# Patient Record
Sex: FEMALE | Race: WHITE | NOT HISPANIC OR LATINO | Employment: UNEMPLOYED | URBAN - METROPOLITAN AREA
[De-identification: names, ages, dates, MRNs, and addresses within clinical notes are randomized per-mention and may not be internally consistent; named-entity substitution may affect disease eponyms.]

---

## 2017-01-13 ENCOUNTER — HISTORICAL (OUTPATIENT)
Dept: RADIOLOGY | Facility: HOSPITAL | Age: 57
End: 2017-01-13

## 2017-02-13 ENCOUNTER — HISTORICAL (OUTPATIENT)
Dept: INFUSION THERAPY | Facility: HOSPITAL | Age: 57
End: 2017-02-13

## 2017-03-10 ENCOUNTER — HISTORICAL (OUTPATIENT)
Dept: RADIOLOGY | Facility: HOSPITAL | Age: 57
End: 2017-03-10

## 2017-03-14 ENCOUNTER — HISTORICAL (OUTPATIENT)
Dept: RADIOLOGY | Facility: HOSPITAL | Age: 57
End: 2017-03-14

## 2017-04-26 ENCOUNTER — HISTORICAL (OUTPATIENT)
Dept: RADIOLOGY | Facility: HOSPITAL | Age: 57
End: 2017-04-26

## 2017-05-26 ENCOUNTER — HISTORICAL (OUTPATIENT)
Dept: LAB | Facility: HOSPITAL | Age: 57
End: 2017-05-26

## 2017-05-26 LAB
ABS NEUT (OLG): 3.4 X10(3)/MCL (ref 1.5–6.9)
ALBUMIN SERPL-MCNC: 3.8 GM/DL (ref 3.4–5)
ALBUMIN/GLOB SERPL: 1.2 RATIO
ALP SERPL-CCNC: 73 UNIT/L (ref 30–113)
ALT SERPL-CCNC: 20 UNIT/L (ref 10–45)
AST SERPL-CCNC: 16 UNIT/L (ref 15–37)
BASOPHILS # BLD AUTO: 0 X10(3)/MCL (ref 0–0.1)
BASOPHILS NFR BLD AUTO: 0 % (ref 0–1)
BILIRUB SERPL-MCNC: 0.5 MG/DL (ref 0.1–0.9)
BILIRUBIN DIRECT+TOT PNL SERPL-MCNC: 0.1 MG/DL (ref 0–0.3)
BILIRUBIN DIRECT+TOT PNL SERPL-MCNC: 0.4 MG/DL
BUN SERPL-MCNC: 15 MG/DL (ref 10–20)
CALCIUM SERPL-MCNC: 9.1 MG/DL (ref 8–10.5)
CHLORIDE SERPL-SCNC: 105 MMOL/L (ref 100–108)
CO2 SERPL-SCNC: 28 MMOL/L (ref 21–35)
CREAT SERPL-MCNC: 0.84 MG/DL (ref 0.7–1.3)
EOSINOPHIL # BLD AUTO: 0.2 X10(3)/MCL (ref 0–0.6)
EOSINOPHIL NFR BLD AUTO: 3 % (ref 0–5)
ERYTHROCYTE [DISTWIDTH] IN BLOOD BY AUTOMATED COUNT: 13.3 % (ref 11.5–17)
GLOBULIN SER-MCNC: 3.3 GM/DL
GLUCOSE SERPL-MCNC: 104 MG/DL (ref 75–116)
HCT VFR BLD AUTO: 42.1 % (ref 36–48)
HGB BLD-MCNC: 14.5 GM/DL (ref 12–16)
LYMPHOCYTES # BLD AUTO: 1.7 X10(3)/MCL (ref 0.5–4.1)
LYMPHOCYTES NFR BLD AUTO: 29.7 % (ref 15–40)
MCH RBC QN AUTO: 30 PG (ref 27–34)
MCHC RBC AUTO-ENTMCNC: 34 GM/DL (ref 31–36)
MCV RBC AUTO: 86 FL (ref 80–99)
MONOCYTES # BLD AUTO: 0.5 X10(3)/MCL (ref 0–1.1)
MONOCYTES NFR BLD AUTO: 9 % (ref 4–12)
NEUTROPHILS # BLD AUTO: 3.4 X10(3)/MCL (ref 1.5–6.9)
NEUTROPHILS NFR BLD AUTO: 58 % (ref 43–75)
PLATELET # BLD AUTO: 238 X10(3)/MCL (ref 140–400)
PMV BLD AUTO: 10.6 FL (ref 6.8–10)
POTASSIUM SERPL-SCNC: 4 MMOL/L (ref 3.6–5.2)
PROT SERPL-MCNC: 7.1 GM/DL (ref 6.4–8.2)
RBC # BLD AUTO: 4.87 X10(6)/MCL (ref 4.2–5.4)
SODIUM SERPL-SCNC: 141 MMOL/L (ref 135–145)
WBC # SPEC AUTO: 5.8 X10(3)/MCL (ref 4.5–11.5)

## 2017-09-05 ENCOUNTER — HISTORICAL (OUTPATIENT)
Dept: LAB | Facility: HOSPITAL | Age: 57
End: 2017-09-05

## 2017-09-05 LAB
ABS NEUT (OLG): 3.2 X10(3)/MCL (ref 1.5–6.9)
ALBUMIN SERPL-MCNC: 3.9 GM/DL (ref 3.4–5)
ALBUMIN/GLOB SERPL: 1.2 RATIO
ALP SERPL-CCNC: 90 UNIT/L (ref 30–113)
ALT SERPL-CCNC: 25 UNIT/L (ref 10–45)
APPEARANCE, UA: CLEAR
AST SERPL-CCNC: 20 UNIT/L (ref 15–37)
BASOPHILS # BLD AUTO: 0 X10(3)/MCL (ref 0–0.1)
BASOPHILS NFR BLD AUTO: 0 % (ref 0–1)
BILIRUB SERPL-MCNC: 0.5 MG/DL (ref 0.1–0.9)
BILIRUB UR QL STRIP: NEGATIVE
BILIRUBIN DIRECT+TOT PNL SERPL-MCNC: 0.2 MG/DL (ref 0–0.3)
BILIRUBIN DIRECT+TOT PNL SERPL-MCNC: 0.3 MG/DL
BUN SERPL-MCNC: 19 MG/DL (ref 10–20)
CALCIUM SERPL-MCNC: 9.7 MG/DL (ref 8–10.5)
CHLORIDE SERPL-SCNC: 105 MMOL/L (ref 100–108)
CHOLEST SERPL-MCNC: 153 MG/DL (ref 140–200)
CHOLEST/HDLC SERPL: 2 MG/DL (ref 0–8)
CO2 SERPL-SCNC: 25 MMOL/L (ref 21–35)
COLOR UR: NORMAL
CREAT SERPL-MCNC: 0.8 MG/DL (ref 0.7–1.3)
DEPRECATED CALCIDIOL+CALCIFEROL SERPL-MC: 47.87 NG/ML (ref 30–80)
EOSINOPHIL # BLD AUTO: 0.2 X10(3)/MCL (ref 0–0.6)
EOSINOPHIL NFR BLD AUTO: 3 % (ref 0–5)
ERYTHROCYTE [DISTWIDTH] IN BLOOD BY AUTOMATED COUNT: 13 % (ref 11.5–17)
FOLATE SERPL-MCNC: 14.4 NG/ML (ref 8.6–58.9)
FT4I SERPL CALC-MCNC: 4.2 (ref 5.9–13.1)
GLOBULIN SER-MCNC: 3.2 GM/DL
GLUCOSE (UA): NEGATIVE
GLUCOSE SERPL-MCNC: 105 MG/DL (ref 75–116)
HCT VFR BLD AUTO: 41.3 % (ref 36–48)
HDLC SERPL-MCNC: 66 MG/DL (ref 35–59)
HGB BLD-MCNC: 13.9 GM/DL (ref 12–16)
HGB UR QL STRIP: NEGATIVE
KETONES UR QL STRIP: NEGATIVE
LDLC SERPL CALC-MCNC: 71 MG/DL (ref 100–129)
LEUKOCYTE ESTERASE UR QL STRIP: NEGATIVE
LYMPHOCYTES # BLD AUTO: 1.6 X10(3)/MCL (ref 0.5–4.1)
LYMPHOCYTES NFR BLD AUTO: 29 % (ref 15–40)
MAGNESIUM SERPL-MCNC: 2.2 MG/DL (ref 1.8–2.4)
MCH RBC QN AUTO: 30 PG (ref 27–34)
MCHC RBC AUTO-ENTMCNC: 34 GM/DL (ref 31–36)
MCV RBC AUTO: 88 FL (ref 80–99)
MONOCYTES # BLD AUTO: 0.6 X10(3)/MCL (ref 0–1.1)
MONOCYTES NFR BLD AUTO: 10 % (ref 4–12)
NEUTROPHILS # BLD AUTO: 3.2 X10(3)/MCL (ref 1.5–6.9)
NEUTROPHILS NFR BLD AUTO: 56 % (ref 43–75)
NITRITE UR QL STRIP: NEGATIVE
PH UR STRIP: 5.5 [PH]
PLATELET # BLD AUTO: 241 X10(3)/MCL (ref 140–400)
PMV BLD AUTO: 11.1 FL (ref 6.8–10)
POTASSIUM SERPL-SCNC: 4.1 MMOL/L (ref 3.6–5.2)
PROT SERPL-MCNC: 7.1 GM/DL (ref 6.4–8.2)
PROT UR QL STRIP: NEGATIVE
RBC # BLD AUTO: 4.7 X10(6)/MCL (ref 4.2–5.4)
SODIUM SERPL-SCNC: 140 MMOL/L (ref 135–145)
SP GR UR STRIP: 1.02
T3RU NFR SERPL: 35 % (ref 30–39)
T4 SERPL-MCNC: 11.9 MCG/DL (ref 5.3–11.5)
TRIGL SERPL-MCNC: 85 MG/DL (ref 35–150)
TSH SERPL-ACNC: 3.03 MIU/ML (ref 0.36–3.74)
UROBILINOGEN UR STRIP-ACNC: 0.2 EU/DL
VIT B12 SERPL-MCNC: 587 PG/ML (ref 193–986)
VLDLC SERPL CALC-MCNC: 17 MG/DL
WBC # SPEC AUTO: 5.6 X10(3)/MCL (ref 4.5–11.5)

## 2017-09-18 ENCOUNTER — HISTORICAL (OUTPATIENT)
Dept: RADIOLOGY | Facility: HOSPITAL | Age: 57
End: 2017-09-18

## 2017-12-07 ENCOUNTER — HISTORICAL (OUTPATIENT)
Dept: LAB | Facility: HOSPITAL | Age: 57
End: 2017-12-07

## 2017-12-07 LAB
ABS NEUT (OLG): 3.2 X10(3)/MCL (ref 1.5–6.9)
ALBUMIN SERPL-MCNC: 3.9 GM/DL (ref 3.4–5)
ALBUMIN/GLOB SERPL: 1.2 RATIO
ALP SERPL-CCNC: 125 UNIT/L (ref 30–113)
ALT SERPL-CCNC: 19 UNIT/L (ref 10–45)
AST SERPL-CCNC: 14 UNIT/L (ref 15–37)
BASOPHILS # BLD AUTO: 0 X10(3)/MCL (ref 0–0.1)
BASOPHILS NFR BLD AUTO: 0 % (ref 0–1)
BILIRUB SERPL-MCNC: 0.4 MG/DL (ref 0.1–0.9)
BILIRUBIN DIRECT+TOT PNL SERPL-MCNC: 0.1 MG/DL (ref 0–0.3)
BILIRUBIN DIRECT+TOT PNL SERPL-MCNC: 0.3 MG/DL
BUN SERPL-MCNC: 12 MG/DL (ref 10–20)
CALCIUM SERPL-MCNC: 9.7 MG/DL (ref 8–10.5)
CHLORIDE SERPL-SCNC: 106 MMOL/L (ref 100–108)
CO2 SERPL-SCNC: 26 MMOL/L (ref 21–35)
CREAT SERPL-MCNC: 0.75 MG/DL (ref 0.7–1.3)
EOSINOPHIL # BLD AUTO: 0.2 X10(3)/MCL (ref 0–0.6)
EOSINOPHIL NFR BLD AUTO: 3 % (ref 0–5)
ERYTHROCYTE [DISTWIDTH] IN BLOOD BY AUTOMATED COUNT: 13 % (ref 11.5–17)
GLOBULIN SER-MCNC: 3.2 GM/DL
GLUCOSE SERPL-MCNC: 108 MG/DL (ref 75–116)
HCT VFR BLD AUTO: 41.9 % (ref 36–48)
HGB BLD-MCNC: 14.3 GM/DL (ref 12–16)
LYMPHOCYTES # BLD AUTO: 1.9 X10(3)/MCL (ref 0.5–4.1)
LYMPHOCYTES NFR BLD AUTO: 32.8 % (ref 15–40)
MCH RBC QN AUTO: 30 PG (ref 27–34)
MCHC RBC AUTO-ENTMCNC: 34 GM/DL (ref 31–36)
MCV RBC AUTO: 86 FL (ref 80–99)
MONOCYTES # BLD AUTO: 0.5 X10(3)/MCL (ref 0–1.1)
MONOCYTES NFR BLD AUTO: 8 % (ref 4–12)
NEUTROPHILS # BLD AUTO: 3.2 X10(3)/MCL (ref 1.5–6.9)
NEUTROPHILS NFR BLD AUTO: 56 % (ref 43–75)
PLATELET # BLD AUTO: 247 X10(3)/MCL (ref 140–400)
PMV BLD AUTO: 10.6 FL (ref 6.8–10)
POTASSIUM SERPL-SCNC: 3.9 MMOL/L (ref 3.6–5.2)
PROT SERPL-MCNC: 7.1 GM/DL (ref 6.4–8.2)
RBC # BLD AUTO: 4.85 X10(6)/MCL (ref 4.2–5.4)
SODIUM SERPL-SCNC: 142 MMOL/L (ref 135–145)
WBC # SPEC AUTO: 5.7 X10(3)/MCL (ref 4.5–11.5)

## 2018-03-08 ENCOUNTER — HISTORICAL (OUTPATIENT)
Dept: LAB | Facility: HOSPITAL | Age: 58
End: 2018-03-08

## 2018-03-08 LAB
ABS NEUT (OLG): 2.8 X10(3)/MCL (ref 1.5–6.9)
ALBUMIN SERPL-MCNC: 3.6 GM/DL (ref 3.4–5)
ALBUMIN/GLOB SERPL: 1.1 RATIO
ALP SERPL-CCNC: 117 UNIT/L (ref 30–113)
ALT SERPL-CCNC: 20 UNIT/L (ref 10–45)
APPEARANCE, UA: CLEAR
AST SERPL-CCNC: 15 UNIT/L (ref 15–37)
BASOPHILS # BLD AUTO: 0 X10(3)/MCL (ref 0–0.1)
BASOPHILS NFR BLD AUTO: 0 % (ref 0–1)
BILIRUB SERPL-MCNC: 0.5 MG/DL (ref 0.1–0.9)
BILIRUB UR QL STRIP: NEGATIVE
BILIRUBIN DIRECT+TOT PNL SERPL-MCNC: 0.2 MG/DL (ref 0–0.3)
BILIRUBIN DIRECT+TOT PNL SERPL-MCNC: 0.3 MG/DL
BUN SERPL-MCNC: 18 MG/DL (ref 10–20)
CALCIUM SERPL-MCNC: 9.5 MG/DL (ref 8–10.5)
CHLORIDE SERPL-SCNC: 106 MMOL/L (ref 100–108)
CHOLEST SERPL-MCNC: 154 MG/DL (ref 140–200)
CHOLEST/HDLC SERPL: 2 MG/DL (ref 0–8)
CO2 SERPL-SCNC: 27 MMOL/L (ref 21–35)
COLOR UR: NORMAL
CREAT SERPL-MCNC: 0.63 MG/DL (ref 0.7–1.3)
DEPRECATED CALCIDIOL+CALCIFEROL SERPL-MC: 30.75 NG/ML (ref 30–80)
EOSINOPHIL # BLD AUTO: 0.1 X10(3)/MCL (ref 0–0.6)
EOSINOPHIL NFR BLD AUTO: 3 % (ref 0–5)
ERYTHROCYTE [DISTWIDTH] IN BLOOD BY AUTOMATED COUNT: 13.1 % (ref 11.5–17)
FT4I SERPL CALC-MCNC: 3.9 (ref 5.9–13.1)
GLOBULIN SER-MCNC: 3.3 GM/DL
GLUCOSE (UA): NEGATIVE
GLUCOSE SERPL-MCNC: 101 MG/DL (ref 75–116)
HCT VFR BLD AUTO: 41.9 % (ref 36–48)
HDLC SERPL-MCNC: 78 MG/DL (ref 35–59)
HGB BLD-MCNC: 14 GM/DL (ref 12–16)
HGB UR QL STRIP: NEGATIVE
KETONES UR QL STRIP: NEGATIVE
LDLC SERPL CALC-MCNC: 79 MG/DL (ref 100–129)
LEUKOCYTE ESTERASE UR QL STRIP: NEGATIVE
LYMPHOCYTES # BLD AUTO: 1.7 X10(3)/MCL (ref 0.5–4.1)
LYMPHOCYTES NFR BLD AUTO: 33.5 % (ref 15–40)
MAGNESIUM SERPL-MCNC: 2.1 MG/DL (ref 1.8–2.4)
MCH RBC QN AUTO: 29 PG (ref 27–34)
MCHC RBC AUTO-ENTMCNC: 33 GM/DL (ref 31–36)
MCV RBC AUTO: 87 FL (ref 80–99)
MONOCYTES # BLD AUTO: 0.5 X10(3)/MCL (ref 0–1.1)
MONOCYTES NFR BLD AUTO: 10 % (ref 4–12)
NEUTROPHILS # BLD AUTO: 2.8 X10(3)/MCL (ref 1.5–6.9)
NEUTROPHILS NFR BLD AUTO: 54 % (ref 43–75)
NITRITE UR QL STRIP: NEGATIVE
PH UR STRIP: 5.5 [PH]
PLATELET # BLD AUTO: 253 X10(3)/MCL (ref 140–400)
PMV BLD AUTO: 11 FL (ref 6.8–10)
POTASSIUM SERPL-SCNC: 3.9 MMOL/L (ref 3.6–5.2)
PROT SERPL-MCNC: 6.9 GM/DL (ref 6.4–8.2)
PROT UR QL STRIP: NEGATIVE
RBC # BLD AUTO: 4.81 X10(6)/MCL (ref 4.2–5.4)
SODIUM SERPL-SCNC: 141 MMOL/L (ref 135–145)
SP GR UR STRIP: >=1.03
T3RU NFR SERPL: 35 % (ref 30–39)
T4 SERPL-MCNC: 11.2 MCG/DL (ref 5.3–11.5)
TRIGL SERPL-MCNC: 81 MG/DL (ref 35–150)
TSH SERPL-ACNC: 1.75 MIU/ML (ref 0.36–3.74)
UROBILINOGEN UR STRIP-ACNC: 0.2 EU/DL
VLDLC SERPL CALC-MCNC: 16 MG/DL
WBC # SPEC AUTO: 5.2 X10(3)/MCL (ref 4.5–11.5)

## 2018-03-16 ENCOUNTER — HISTORICAL (OUTPATIENT)
Dept: RADIOLOGY | Facility: HOSPITAL | Age: 58
End: 2018-03-16

## 2018-09-19 ENCOUNTER — HISTORICAL (OUTPATIENT)
Dept: RADIOLOGY | Facility: HOSPITAL | Age: 58
End: 2018-09-19

## 2018-09-26 ENCOUNTER — HISTORICAL (OUTPATIENT)
Dept: LAB | Facility: HOSPITAL | Age: 58
End: 2018-09-26

## 2018-09-26 LAB
ABS NEUT (OLG): 4.9 X10(3)/MCL (ref 1.5–6.9)
ALBUMIN SERPL-MCNC: 3.7 GM/DL (ref 3.4–5)
ALBUMIN/GLOB SERPL: 1.1 RATIO
ALP SERPL-CCNC: 139 UNIT/L (ref 30–113)
ALT SERPL-CCNC: 22 UNIT/L (ref 10–45)
AST SERPL-CCNC: 13 UNIT/L (ref 15–37)
BASOPHILS # BLD AUTO: 0 X10(3)/MCL (ref 0–0.1)
BASOPHILS NFR BLD AUTO: 0 % (ref 0–1)
BILIRUB SERPL-MCNC: 0.6 MG/DL (ref 0.1–0.9)
BILIRUBIN DIRECT+TOT PNL SERPL-MCNC: 0.2 MG/DL (ref 0–0.3)
BILIRUBIN DIRECT+TOT PNL SERPL-MCNC: 0.4 MG/DL
BUN SERPL-MCNC: 10 MG/DL (ref 10–20)
CALCIUM SERPL-MCNC: 8.7 MG/DL (ref 8–10.5)
CHLORIDE SERPL-SCNC: 106 MMOL/L (ref 100–108)
CO2 SERPL-SCNC: 29 MMOL/L (ref 21–35)
CREAT SERPL-MCNC: 0.79 MG/DL (ref 0.7–1.3)
EOSINOPHIL # BLD AUTO: 0.3 X10(3)/MCL (ref 0–0.6)
EOSINOPHIL NFR BLD AUTO: 4 % (ref 0–5)
ERYTHROCYTE [DISTWIDTH] IN BLOOD BY AUTOMATED COUNT: 13.4 % (ref 11.5–17)
GLOBULIN SER-MCNC: 3.3 GM/DL
GLUCOSE SERPL-MCNC: 106 MG/DL (ref 75–116)
HCT VFR BLD AUTO: 41.7 % (ref 36–48)
HGB BLD-MCNC: 14.3 GM/DL (ref 12–16)
LYMPHOCYTES # BLD AUTO: 1.5 X10(3)/MCL (ref 0.5–4.1)
LYMPHOCYTES NFR BLD AUTO: 20.5 % (ref 15–40)
MCH RBC QN AUTO: 30 PG (ref 27–34)
MCHC RBC AUTO-ENTMCNC: 34 GM/DL (ref 31–36)
MCV RBC AUTO: 86 FL (ref 80–99)
MONOCYTES # BLD AUTO: 0.6 X10(3)/MCL (ref 0–1.1)
MONOCYTES NFR BLD AUTO: 8 % (ref 4–12)
NEUTROPHILS # BLD AUTO: 4.9 X10(3)/MCL (ref 1.5–6.9)
NEUTROPHILS NFR BLD AUTO: 67 % (ref 43–75)
PLATELET # BLD AUTO: 261 X10(3)/MCL (ref 140–400)
PMV BLD AUTO: 10.4 FL (ref 6.8–10)
POTASSIUM SERPL-SCNC: 3.7 MMOL/L (ref 3.6–5.2)
PROT SERPL-MCNC: 7 GM/DL (ref 6.4–8.2)
RBC # BLD AUTO: 4.82 X10(6)/MCL (ref 4.2–5.4)
SODIUM SERPL-SCNC: 142 MMOL/L (ref 135–145)
WBC # SPEC AUTO: 7.3 X10(3)/MCL (ref 4.5–11.5)

## 2019-01-24 ENCOUNTER — HISTORICAL (OUTPATIENT)
Dept: LAB | Facility: HOSPITAL | Age: 59
End: 2019-01-24

## 2019-01-24 LAB
ABS NEUT (OLG): 3 X10(3)/MCL (ref 1.5–6.9)
ALBUMIN SERPL-MCNC: 3.7 GM/DL (ref 3.4–5)
ALBUMIN/GLOB SERPL: 1.1 RATIO
ALP SERPL-CCNC: 115 UNIT/L (ref 30–113)
ALT SERPL-CCNC: 23 UNIT/L (ref 10–45)
AST SERPL-CCNC: 16 UNIT/L (ref 15–37)
BASOPHILS # BLD AUTO: 0 X10(3)/MCL (ref 0–0.1)
BASOPHILS NFR BLD AUTO: 0 % (ref 0–1)
BILIRUB SERPL-MCNC: 0.5 MG/DL (ref 0.1–0.9)
BILIRUBIN DIRECT+TOT PNL SERPL-MCNC: 0.1 MG/DL (ref 0–0.3)
BILIRUBIN DIRECT+TOT PNL SERPL-MCNC: 0.4 MG/DL
BUN SERPL-MCNC: 17 MG/DL (ref 10–20)
CALCIUM SERPL-MCNC: 9.3 MG/DL (ref 8–10.5)
CHLORIDE SERPL-SCNC: 105 MMOL/L (ref 100–108)
CO2 SERPL-SCNC: 25 MMOL/L (ref 21–35)
CREAT SERPL-MCNC: 0.76 MG/DL (ref 0.7–1.3)
EOSINOPHIL # BLD AUTO: 0.2 X10(3)/MCL (ref 0–0.6)
EOSINOPHIL NFR BLD AUTO: 4 % (ref 0–5)
ERYTHROCYTE [DISTWIDTH] IN BLOOD BY AUTOMATED COUNT: 13 % (ref 11.5–17)
GLOBULIN SER-MCNC: 3.3 GM/DL
GLUCOSE SERPL-MCNC: 106 MG/DL (ref 75–116)
HCT VFR BLD AUTO: 45 % (ref 36–48)
HGB BLD-MCNC: 14.4 GM/DL (ref 12–16)
IMM GRANULOCYTES # BLD AUTO: 0.01 10*3/UL (ref 0–0.02)
IMM GRANULOCYTES NFR BLD AUTO: 0.2 % (ref 0–0.43)
LYMPHOCYTES # BLD AUTO: 1.8 X10(3)/MCL (ref 0.5–4.1)
LYMPHOCYTES NFR BLD AUTO: 33 % (ref 15–40)
MCH RBC QN AUTO: 28 PG (ref 27–34)
MCHC RBC AUTO-ENTMCNC: 32 GM/DL (ref 31–36)
MCV RBC AUTO: 88 FL (ref 80–99)
MONOCYTES # BLD AUTO: 0.4 X10(3)/MCL (ref 0–1.1)
MONOCYTES NFR BLD AUTO: 8 % (ref 4–12)
NEUTROPHILS # BLD AUTO: 3 X10(3)/MCL (ref 1.5–6.9)
NEUTROPHILS NFR BLD AUTO: 55 % (ref 43–75)
PLATELET # BLD AUTO: 237 X10(3)/MCL (ref 140–400)
PMV BLD AUTO: 11 FL (ref 6.8–10)
POTASSIUM SERPL-SCNC: 3.9 MMOL/L (ref 3.6–5.2)
PROT SERPL-MCNC: 7 GM/DL (ref 6.4–8.2)
RBC # BLD AUTO: 5.1 X10(6)/MCL (ref 4.2–5.4)
SODIUM SERPL-SCNC: 141 MMOL/L (ref 135–145)
WBC # SPEC AUTO: 5.5 X10(3)/MCL (ref 4.5–11.5)

## 2019-01-25 ENCOUNTER — HISTORICAL (OUTPATIENT)
Dept: RADIOLOGY | Facility: HOSPITAL | Age: 59
End: 2019-01-25

## 2019-05-28 ENCOUNTER — HISTORICAL (OUTPATIENT)
Dept: LAB | Facility: HOSPITAL | Age: 59
End: 2019-05-28

## 2019-05-28 LAB
ABS NEUT (OLG): 2.9 X10(3)/MCL (ref 1.5–6.9)
ALBUMIN SERPL-MCNC: 3.6 GM/DL (ref 3.4–5)
ALBUMIN/GLOB SERPL: 1.1 RATIO
ALP SERPL-CCNC: 119 UNIT/L (ref 30–113)
ALT SERPL-CCNC: 20 UNIT/L (ref 10–45)
AST SERPL-CCNC: 15 UNIT/L (ref 15–37)
BASOPHILS # BLD AUTO: 0 X10(3)/MCL (ref 0–0.1)
BASOPHILS NFR BLD AUTO: 1 % (ref 0–1)
BILIRUB SERPL-MCNC: 0.7 MG/DL (ref 0.1–0.9)
BILIRUBIN DIRECT+TOT PNL SERPL-MCNC: 0.2 MG/DL (ref 0–0.3)
BILIRUBIN DIRECT+TOT PNL SERPL-MCNC: 0.5 MG/DL
BUN SERPL-MCNC: 18 MG/DL (ref 10–20)
CALCIUM SERPL-MCNC: 9.5 MG/DL (ref 8–10.5)
CHLORIDE SERPL-SCNC: 107 MMOL/L (ref 100–108)
CO2 SERPL-SCNC: 27 MMOL/L (ref 21–35)
CREAT SERPL-MCNC: 0.8 MG/DL (ref 0.7–1.3)
EOSINOPHIL # BLD AUTO: 0.2 X10(3)/MCL (ref 0–0.6)
EOSINOPHIL NFR BLD AUTO: 4 % (ref 0–5)
ERYTHROCYTE [DISTWIDTH] IN BLOOD BY AUTOMATED COUNT: 13.1 % (ref 11.5–17)
GLOBULIN SER-MCNC: 3.3 GM/DL
GLUCOSE SERPL-MCNC: 108 MG/DL (ref 75–116)
HCT VFR BLD AUTO: 43.7 % (ref 36–48)
HGB BLD-MCNC: 14.3 GM/DL (ref 12–16)
IMM GRANULOCYTES # BLD AUTO: 0.01 10*3/UL (ref 0–0.02)
IMM GRANULOCYTES NFR BLD AUTO: 0.2 % (ref 0–0.43)
LYMPHOCYTES # BLD AUTO: 1.8 X10(3)/MCL (ref 0.5–4.1)
LYMPHOCYTES NFR BLD AUTO: 34 % (ref 15–40)
MCH RBC QN AUTO: 29 PG (ref 27–34)
MCHC RBC AUTO-ENTMCNC: 33 GM/DL (ref 31–36)
MCV RBC AUTO: 89 FL (ref 80–99)
MONOCYTES # BLD AUTO: 0.4 X10(3)/MCL (ref 0–1.1)
MONOCYTES NFR BLD AUTO: 8 % (ref 4–12)
NEUTROPHILS # BLD AUTO: 2.9 X10(3)/MCL (ref 1.5–6.9)
NEUTROPHILS NFR BLD AUTO: 54 % (ref 43–75)
PLATELET # BLD AUTO: 224 X10(3)/MCL (ref 140–400)
PMV BLD AUTO: 10.1 FL (ref 6.8–10)
POTASSIUM SERPL-SCNC: 3.5 MMOL/L (ref 3.6–5.2)
PROT SERPL-MCNC: 6.9 GM/DL (ref 6.4–8.2)
RBC # BLD AUTO: 4.92 X10(6)/MCL (ref 4.2–5.4)
SODIUM SERPL-SCNC: 142 MMOL/L (ref 135–145)
WBC # SPEC AUTO: 5.4 X10(3)/MCL (ref 4.5–11.5)

## 2019-09-23 ENCOUNTER — HISTORICAL (OUTPATIENT)
Dept: RADIOLOGY | Facility: HOSPITAL | Age: 59
End: 2019-09-23

## 2019-09-27 ENCOUNTER — HISTORICAL (OUTPATIENT)
Dept: LAB | Facility: HOSPITAL | Age: 59
End: 2019-09-27

## 2019-09-27 LAB
ABS NEUT (OLG): 2.9 X10(3)/MCL (ref 1.5–6.9)
ALBUMIN SERPL-MCNC: 3.4 GM/DL (ref 3.4–5)
ALBUMIN/GLOB SERPL: 1.1 RATIO
ALP SERPL-CCNC: 108 UNIT/L (ref 30–113)
ALT SERPL-CCNC: 20 UNIT/L (ref 10–45)
AST SERPL-CCNC: 14 UNIT/L (ref 15–37)
BASOPHILS # BLD AUTO: 0 X10(3)/MCL (ref 0–0.1)
BASOPHILS NFR BLD AUTO: 1 % (ref 0–1)
BILIRUB SERPL-MCNC: 0.6 MG/DL (ref 0.1–0.9)
BILIRUBIN DIRECT+TOT PNL SERPL-MCNC: 0.2 MG/DL (ref 0–0.3)
BILIRUBIN DIRECT+TOT PNL SERPL-MCNC: 0.4 MG/DL
BUN SERPL-MCNC: 11 MG/DL (ref 10–20)
CALCIUM SERPL-MCNC: 8.6 MG/DL (ref 8–10.5)
CHLORIDE SERPL-SCNC: 106 MMOL/L (ref 100–108)
CO2 SERPL-SCNC: 30 MMOL/L (ref 21–35)
CREAT SERPL-MCNC: 0.76 MG/DL (ref 0.7–1.3)
EOSINOPHIL # BLD AUTO: 0.2 X10(3)/MCL (ref 0–0.6)
EOSINOPHIL NFR BLD AUTO: 4 % (ref 0–5)
ERYTHROCYTE [DISTWIDTH] IN BLOOD BY AUTOMATED COUNT: 13 % (ref 11.5–17)
GLOBULIN SER-MCNC: 3.2 GM/DL
GLUCOSE SERPL-MCNC: 100 MG/DL (ref 75–116)
HCT VFR BLD AUTO: 42.9 % (ref 36–48)
HGB BLD-MCNC: 14.1 GM/DL (ref 12–16)
IMM GRANULOCYTES # BLD AUTO: 0.01 10*3/UL (ref 0–0.02)
IMM GRANULOCYTES NFR BLD AUTO: 0.2 % (ref 0–0.43)
LYMPHOCYTES # BLD AUTO: 1.9 X10(3)/MCL (ref 0.5–4.1)
LYMPHOCYTES NFR BLD AUTO: 35 % (ref 15–40)
MCH RBC QN AUTO: 29 PG (ref 27–34)
MCHC RBC AUTO-ENTMCNC: 33 GM/DL (ref 31–36)
MCV RBC AUTO: 88 FL (ref 80–99)
MONOCYTES # BLD AUTO: 0.4 X10(3)/MCL (ref 0–1.1)
MONOCYTES NFR BLD AUTO: 7 % (ref 4–12)
NEUTROPHILS # BLD AUTO: 2.9 X10(3)/MCL (ref 1.5–6.9)
NEUTROPHILS NFR BLD AUTO: 54 % (ref 43–75)
PLATELET # BLD AUTO: 239 X10(3)/MCL (ref 140–400)
PMV BLD AUTO: 10.2 FL (ref 6.8–10)
POTASSIUM SERPL-SCNC: 3.5 MMOL/L (ref 3.6–5.2)
PROT SERPL-MCNC: 6.6 GM/DL (ref 6.4–8.2)
RBC # BLD AUTO: 4.89 X10(6)/MCL (ref 4.2–5.4)
SODIUM SERPL-SCNC: 143 MMOL/L (ref 135–145)
WBC # SPEC AUTO: 5.4 X10(3)/MCL (ref 4.5–11.5)

## 2019-09-30 ENCOUNTER — HISTORICAL (OUTPATIENT)
Dept: INFUSION THERAPY | Facility: HOSPITAL | Age: 59
End: 2019-09-30

## 2019-10-14 ENCOUNTER — HISTORICAL (OUTPATIENT)
Dept: CARDIOLOGY | Facility: HOSPITAL | Age: 59
End: 2019-10-14

## 2020-01-27 ENCOUNTER — HISTORICAL (OUTPATIENT)
Dept: LAB | Facility: HOSPITAL | Age: 60
End: 2020-01-27

## 2020-01-27 LAB
ABS NEUT (OLG): 2.7 X10(3)/MCL (ref 1.5–6.9)
ALBUMIN SERPL-MCNC: 3.6 GM/DL (ref 3.4–5)
ALBUMIN/GLOB SERPL: 1.1 RATIO
ALP SERPL-CCNC: 99 UNIT/L (ref 30–113)
ALT SERPL-CCNC: 20 UNIT/L (ref 10–45)
AST SERPL-CCNC: 13 UNIT/L (ref 15–37)
BASOPHILS # BLD AUTO: 0 X10(3)/MCL (ref 0–0.1)
BASOPHILS NFR BLD AUTO: 1 % (ref 0–1)
BILIRUB SERPL-MCNC: 0.7 MG/DL (ref 0.1–0.9)
BILIRUBIN DIRECT+TOT PNL SERPL-MCNC: 0.2 MG/DL (ref 0–0.3)
BILIRUBIN DIRECT+TOT PNL SERPL-MCNC: 0.5 MG/DL
BUN SERPL-MCNC: 14 MG/DL (ref 10–20)
CALCIUM SERPL-MCNC: 9.2 MG/DL (ref 8–10.5)
CHLORIDE SERPL-SCNC: 109 MMOL/L (ref 100–108)
CO2 SERPL-SCNC: 27 MMOL/L (ref 21–35)
CREAT SERPL-MCNC: 0.72 MG/DL (ref 0.7–1.3)
EOSINOPHIL # BLD AUTO: 0.2 X10(3)/MCL (ref 0–0.6)
EOSINOPHIL NFR BLD AUTO: 4 % (ref 0–5)
ERYTHROCYTE [DISTWIDTH] IN BLOOD BY AUTOMATED COUNT: 13.3 % (ref 11.5–17)
GLOBULIN SER-MCNC: 3.2 GM/DL
GLUCOSE SERPL-MCNC: 109 MG/DL (ref 75–116)
HCT VFR BLD AUTO: 43.5 % (ref 36–48)
HGB BLD-MCNC: 14.3 GM/DL (ref 12–16)
IMM GRANULOCYTES # BLD AUTO: 0.01 10*3/UL (ref 0–0.02)
IMM GRANULOCYTES NFR BLD AUTO: 0.2 % (ref 0–0.43)
LYMPHOCYTES # BLD AUTO: 1.6 X10(3)/MCL (ref 0.5–4.1)
LYMPHOCYTES NFR BLD AUTO: 33 % (ref 15–40)
MCH RBC QN AUTO: 29 PG (ref 27–34)
MCHC RBC AUTO-ENTMCNC: 33 GM/DL (ref 31–36)
MCV RBC AUTO: 88 FL (ref 80–99)
MONOCYTES # BLD AUTO: 0.3 X10(3)/MCL (ref 0–1.1)
MONOCYTES NFR BLD AUTO: 7 % (ref 4–12)
NEUTROPHILS # BLD AUTO: 2.7 X10(3)/MCL (ref 1.5–6.9)
NEUTROPHILS NFR BLD AUTO: 55 % (ref 43–75)
PLATELET # BLD AUTO: 233 X10(3)/MCL (ref 140–400)
PMV BLD AUTO: 10.5 FL (ref 6.8–10)
POTASSIUM SERPL-SCNC: 3.5 MMOL/L (ref 3.6–5.2)
PROT SERPL-MCNC: 6.8 GM/DL (ref 6.4–8.2)
RBC # BLD AUTO: 4.94 X10(6)/MCL (ref 4.2–5.4)
SODIUM SERPL-SCNC: 144 MMOL/L (ref 135–145)
WBC # SPEC AUTO: 4.9 X10(3)/MCL (ref 4.5–11.5)

## 2020-02-04 ENCOUNTER — HISTORICAL (OUTPATIENT)
Dept: LAB | Facility: HOSPITAL | Age: 60
End: 2020-02-04

## 2020-02-04 LAB
ABS NEUT (OLG): 3.1 X10(3)/MCL (ref 1.5–6.9)
ALBUMIN SERPL-MCNC: 3.7 GM/DL (ref 3.4–5)
ALBUMIN/GLOB SERPL: 1.1 RATIO
ALP SERPL-CCNC: 103 UNIT/L (ref 30–113)
ALT SERPL-CCNC: 17 UNIT/L (ref 10–45)
AST SERPL-CCNC: 11 UNIT/L (ref 15–37)
BASOPHILS # BLD AUTO: 0.1 X10(3)/MCL (ref 0–0.1)
BASOPHILS NFR BLD AUTO: 1 % (ref 0–1)
BILIRUB SERPL-MCNC: 0.6 MG/DL (ref 0.1–0.9)
BILIRUBIN DIRECT+TOT PNL SERPL-MCNC: 0.2 MG/DL (ref 0–0.3)
BILIRUBIN DIRECT+TOT PNL SERPL-MCNC: 0.4 MG/DL
BUN SERPL-MCNC: 18 MG/DL (ref 10–20)
CALCIUM SERPL-MCNC: 9.5 MG/DL (ref 8–10.5)
CHLORIDE SERPL-SCNC: 108 MMOL/L (ref 100–108)
CO2 SERPL-SCNC: 30 MMOL/L (ref 21–35)
CREAT SERPL-MCNC: 0.69 MG/DL (ref 0.7–1.3)
DEPRECATED CALCIDIOL+CALCIFEROL SERPL-MC: 37.15 NG/ML (ref 30–80)
EOSINOPHIL # BLD AUTO: 0.2 X10(3)/MCL (ref 0–0.6)
EOSINOPHIL NFR BLD AUTO: 3 % (ref 0–5)
ERYTHROCYTE [DISTWIDTH] IN BLOOD BY AUTOMATED COUNT: 13.4 % (ref 11.5–17)
GLOBULIN SER-MCNC: 3.4 GM/DL
GLUCOSE SERPL-MCNC: 106 MG/DL (ref 75–116)
HCT VFR BLD AUTO: 43.7 % (ref 36–48)
HGB BLD-MCNC: 14.4 GM/DL (ref 12–16)
IMM GRANULOCYTES # BLD AUTO: 0.01 10*3/UL (ref 0–0.02)
IMM GRANULOCYTES NFR BLD AUTO: 0.2 % (ref 0–0.43)
LYMPHOCYTES # BLD AUTO: 1.7 X10(3)/MCL (ref 0.5–4.1)
LYMPHOCYTES NFR BLD AUTO: 31 % (ref 15–40)
MCH RBC QN AUTO: 29 PG (ref 27–34)
MCHC RBC AUTO-ENTMCNC: 33 GM/DL (ref 31–36)
MCV RBC AUTO: 87 FL (ref 80–99)
MONOCYTES # BLD AUTO: 0.4 X10(3)/MCL (ref 0–1.1)
MONOCYTES NFR BLD AUTO: 7 % (ref 4–12)
NEUTROPHILS # BLD AUTO: 3.1 X10(3)/MCL (ref 1.5–6.9)
NEUTROPHILS NFR BLD AUTO: 57 % (ref 43–75)
PLATELET # BLD AUTO: 234 X10(3)/MCL (ref 140–400)
PMV BLD AUTO: 10.5 FL (ref 6.8–10)
POTASSIUM SERPL-SCNC: 3.6 MMOL/L (ref 3.6–5.2)
PROT SERPL-MCNC: 7.1 GM/DL (ref 6.4–8.2)
RBC # BLD AUTO: 5 X10(6)/MCL (ref 4.2–5.4)
SODIUM SERPL-SCNC: 145 MMOL/L (ref 135–145)
WBC # SPEC AUTO: 5.4 X10(3)/MCL (ref 4.5–11.5)

## 2020-02-06 ENCOUNTER — HISTORICAL (OUTPATIENT)
Dept: INFUSION THERAPY | Facility: HOSPITAL | Age: 60
End: 2020-02-06

## 2020-10-20 ENCOUNTER — HISTORICAL (OUTPATIENT)
Dept: RADIOLOGY | Facility: HOSPITAL | Age: 60
End: 2020-10-20

## 2020-10-20 LAB
ABS NEUT (OLG): 3.4 X10(3)/MCL (ref 1.5–6.9)
ALBUMIN SERPL-MCNC: 3.9 GM/DL (ref 3.5–5)
ALBUMIN/GLOB SERPL: 1.3 RATIO (ref 1.1–2)
ALP SERPL-CCNC: 108 UNIT/L (ref 40–150)
ALT SERPL-CCNC: 15 UNIT/L (ref 0–55)
AST SERPL-CCNC: 17 UNIT/L (ref 5–34)
BASOPHILS # BLD AUTO: 0 X10(3)/MCL (ref 0–0.1)
BASOPHILS NFR BLD AUTO: 1 % (ref 0–1)
BILIRUB SERPL-MCNC: 0.7 MG/DL
BILIRUBIN DIRECT+TOT PNL SERPL-MCNC: 0.3 MG/DL (ref 0–0.5)
BILIRUBIN DIRECT+TOT PNL SERPL-MCNC: 0.4 MG/DL (ref 0–0.8)
BUN SERPL-MCNC: 16 MG/DL (ref 9.8–20.1)
CALCIUM SERPL-MCNC: 10.2 MG/DL (ref 8.4–10.2)
CHLORIDE SERPL-SCNC: 106 MMOL/L (ref 98–107)
CO2 SERPL-SCNC: 28 MMOL/L (ref 22–29)
CREAT SERPL-MCNC: 0.74 MG/DL (ref 0.55–1.02)
DEPRECATED CALCIDIOL+CALCIFEROL SERPL-MC: 61 NG/ML (ref 6.6–49.9)
EOSINOPHIL # BLD AUTO: 0.2 X10(3)/MCL (ref 0–0.6)
EOSINOPHIL NFR BLD AUTO: 4 % (ref 0–5)
ERYTHROCYTE [DISTWIDTH] IN BLOOD BY AUTOMATED COUNT: 13.4 % (ref 11.5–17)
GLOBULIN SER-MCNC: 3.1 GM/DL (ref 2.4–3.5)
GLUCOSE SERPL-MCNC: 103 MG/DL (ref 74–100)
HCT VFR BLD AUTO: 44.4 % (ref 36–48)
HGB BLD-MCNC: 14.5 GM/DL (ref 12–16)
IMM GRANULOCYTES # BLD AUTO: 0.03 10*3/UL (ref 0–0.02)
IMM GRANULOCYTES NFR BLD AUTO: 0.5 % (ref 0–0.43)
LYMPHOCYTES # BLD AUTO: 1.5 X10(3)/MCL (ref 0.5–4.1)
LYMPHOCYTES NFR BLD AUTO: 26 % (ref 15–40)
MCH RBC QN AUTO: 29 PG (ref 27–34)
MCHC RBC AUTO-ENTMCNC: 33 GM/DL (ref 31–36)
MCV RBC AUTO: 88 FL (ref 80–99)
MONOCYTES # BLD AUTO: 0.4 X10(3)/MCL (ref 0–1.1)
MONOCYTES NFR BLD AUTO: 8 % (ref 4–12)
NEUTROPHILS # BLD AUTO: 3.4 X10(3)/MCL (ref 1.5–6.9)
NEUTROPHILS NFR BLD AUTO: 60 % (ref 43–75)
PLATELET # BLD AUTO: 254 X10(3)/MCL (ref 140–400)
PMV BLD AUTO: 10.7 FL (ref 6.8–10)
POTASSIUM SERPL-SCNC: 3.6 MMOL/L (ref 3.5–5.1)
PROT SERPL-MCNC: 7 GM/DL (ref 6.4–8.3)
RBC # BLD AUTO: 5.02 X10(6)/MCL (ref 4.2–5.4)
SODIUM SERPL-SCNC: 141 MMOL/L (ref 136–145)
WBC # SPEC AUTO: 5.7 X10(3)/MCL (ref 4.5–11.5)

## 2021-01-25 ENCOUNTER — HISTORICAL (OUTPATIENT)
Dept: RADIOLOGY | Facility: HOSPITAL | Age: 61
End: 2021-01-25

## 2021-05-10 LAB — CRC RECOMMENDATION EXT: NORMAL

## 2021-05-17 ENCOUNTER — HISTORICAL (OUTPATIENT)
Dept: LAB | Facility: HOSPITAL | Age: 61
End: 2021-05-17

## 2021-05-17 LAB
ABS NEUT (OLG): 2.4 X10(3)/MCL (ref 1.5–6.9)
ALBUMIN SERPL-MCNC: 3.8 GM/DL (ref 3.5–5)
ALBUMIN/GLOB SERPL: 1.3 RATIO (ref 1.1–2)
ALP SERPL-CCNC: 131 UNIT/L (ref 40–150)
ALT SERPL-CCNC: 13 UNIT/L (ref 0–55)
AST SERPL-CCNC: 14 UNIT/L (ref 5–34)
BASOPHILS # BLD AUTO: 0 X10(3)/MCL (ref 0–0.1)
BASOPHILS NFR BLD AUTO: 1 % (ref 0–1)
BILIRUB SERPL-MCNC: 0.7 MG/DL
BILIRUBIN DIRECT+TOT PNL SERPL-MCNC: 0.3 MG/DL (ref 0–0.5)
BILIRUBIN DIRECT+TOT PNL SERPL-MCNC: 0.4 MG/DL (ref 0–0.8)
BUN SERPL-MCNC: 13 MG/DL (ref 9.8–20.1)
CALCIUM SERPL-MCNC: 9.9 MG/DL (ref 8.4–10.2)
CHLORIDE SERPL-SCNC: 108 MMOL/L (ref 98–107)
CO2 SERPL-SCNC: 28 MMOL/L (ref 22–29)
CREAT SERPL-MCNC: 0.68 MG/DL (ref 0.55–1.02)
DEPRECATED CALCIDIOL+CALCIFEROL SERPL-MC: 82.8 NG/ML (ref 30–80)
EOSINOPHIL # BLD AUTO: 0.2 X10(3)/MCL (ref 0–0.6)
EOSINOPHIL NFR BLD AUTO: 3 % (ref 0–5)
ERYTHROCYTE [DISTWIDTH] IN BLOOD BY AUTOMATED COUNT: 13.3 % (ref 11.5–17)
GLOBULIN SER-MCNC: 2.9 GM/DL (ref 2.4–3.5)
GLUCOSE SERPL-MCNC: 114 MG/DL (ref 74–100)
HCT VFR BLD AUTO: 42.4 % (ref 36–48)
HGB BLD-MCNC: 13.9 GM/DL (ref 12–16)
IMM GRANULOCYTES # BLD AUTO: 0.01 10*3/UL (ref 0–0.02)
IMM GRANULOCYTES NFR BLD AUTO: 0.2 % (ref 0–0.43)
LYMPHOCYTES # BLD AUTO: 1.9 X10(3)/MCL (ref 0.5–4.1)
LYMPHOCYTES NFR BLD AUTO: 38 % (ref 15–40)
MCH RBC QN AUTO: 29 PG (ref 27–34)
MCHC RBC AUTO-ENTMCNC: 33 GM/DL (ref 31–36)
MCV RBC AUTO: 88 FL (ref 80–99)
MONOCYTES # BLD AUTO: 0.4 X10(3)/MCL (ref 0–1.1)
MONOCYTES NFR BLD AUTO: 8 % (ref 4–12)
NEUTROPHILS # BLD AUTO: 2.4 X10(3)/MCL (ref 1.5–6.9)
NEUTROPHILS NFR BLD AUTO: 49 % (ref 43–75)
PLATELET # BLD AUTO: 224 X10(3)/MCL (ref 140–400)
PMV BLD AUTO: 10.1 FL (ref 6.8–10)
POTASSIUM SERPL-SCNC: 3.9 MMOL/L (ref 3.5–5.1)
PROT SERPL-MCNC: 6.7 GM/DL (ref 6.4–8.3)
RBC # BLD AUTO: 4.8 X10(6)/MCL (ref 4.2–5.4)
SODIUM SERPL-SCNC: 143 MMOL/L (ref 136–145)
WBC # SPEC AUTO: 4.9 X10(3)/MCL (ref 4.5–11.5)

## 2021-10-20 ENCOUNTER — HISTORICAL (OUTPATIENT)
Dept: RADIOLOGY | Facility: HOSPITAL | Age: 61
End: 2021-10-20

## 2021-11-22 ENCOUNTER — HISTORICAL (OUTPATIENT)
Dept: ADMINISTRATIVE | Facility: HOSPITAL | Age: 61
End: 2021-11-22

## 2021-11-26 ENCOUNTER — HISTORICAL (OUTPATIENT)
Dept: LAB | Facility: HOSPITAL | Age: 61
End: 2021-11-26

## 2021-11-26 LAB
ABS NEUT (OLG): 3.7 X10(3)/MCL (ref 1.5–6.9)
ALBUMIN SERPL-MCNC: 3.9 GM/DL (ref 3.5–5)
ALBUMIN/GLOB SERPL: 1.4 RATIO (ref 1.1–2)
ALP SERPL-CCNC: 144 UNIT/L (ref 40–150)
ALT SERPL-CCNC: 17 UNIT/L (ref 0–55)
AST SERPL-CCNC: 15 UNIT/L (ref 5–34)
BASOPHILS # BLD AUTO: 0 X10(3)/MCL (ref 0–0.1)
BASOPHILS NFR BLD AUTO: 1 % (ref 0–1)
BILIRUB SERPL-MCNC: 0.7 MG/DL
BILIRUBIN DIRECT+TOT PNL SERPL-MCNC: 0.3 MG/DL (ref 0–0.5)
BILIRUBIN DIRECT+TOT PNL SERPL-MCNC: 0.4 MG/DL (ref 0–0.8)
BUN SERPL-MCNC: 11 MG/DL (ref 9.8–20.1)
CALCIUM SERPL-MCNC: 9.6 MG/DL (ref 8.7–10.5)
CHLORIDE SERPL-SCNC: 107 MMOL/L (ref 98–107)
CO2 SERPL-SCNC: 27 MMOL/L (ref 22–29)
CREAT SERPL-MCNC: 0.78 MG/DL (ref 0.55–1.02)
DEPRECATED CALCIDIOL+CALCIFEROL SERPL-MC: 31 NG/ML (ref 30–80)
EOSINOPHIL # BLD AUTO: 0.2 X10(3)/MCL (ref 0–0.6)
EOSINOPHIL NFR BLD AUTO: 3 % (ref 0–5)
ERYTHROCYTE [DISTWIDTH] IN BLOOD BY AUTOMATED COUNT: 13.5 % (ref 11.5–17)
GLOBULIN SER-MCNC: 2.7 GM/DL (ref 2.4–3.5)
GLUCOSE SERPL-MCNC: 103 MG/DL (ref 74–100)
HCT VFR BLD AUTO: 44.6 % (ref 36–48)
HGB BLD-MCNC: 14.5 GM/DL (ref 12–16)
IMM GRANULOCYTES # BLD AUTO: 0.03 10*3/UL (ref 0–0.02)
IMM GRANULOCYTES NFR BLD AUTO: 0.5 % (ref 0–0.43)
LYMPHOCYTES # BLD AUTO: 1.8 X10(3)/MCL (ref 0.5–4.1)
LYMPHOCYTES NFR BLD AUTO: 28 % (ref 15–40)
MCH RBC QN AUTO: 28 PG (ref 27–34)
MCHC RBC AUTO-ENTMCNC: 32 GM/DL (ref 31–36)
MCV RBC AUTO: 88 FL (ref 80–99)
MONOCYTES # BLD AUTO: 0.5 X10(3)/MCL (ref 0–1.1)
MONOCYTES NFR BLD AUTO: 7 % (ref 4–12)
NEUTROPHILS # BLD AUTO: 3.7 X10(3)/MCL (ref 1.5–6.9)
NEUTROPHILS NFR BLD AUTO: 60 % (ref 43–75)
PLATELET # BLD AUTO: 237 X10(3)/MCL (ref 140–400)
PMV BLD AUTO: 10.6 FL (ref 6.8–10)
POTASSIUM SERPL-SCNC: 4 MMOL/L (ref 3.5–5.1)
PROT SERPL-MCNC: 6.6 GM/DL (ref 6.4–8.3)
RBC # BLD AUTO: 5.09 X10(6)/MCL (ref 4.2–5.4)
SODIUM SERPL-SCNC: 143 MMOL/L (ref 136–145)
WBC # SPEC AUTO: 6.2 X10(3)/MCL (ref 4.5–11.5)

## 2022-05-30 RX ORDER — LETROZOLE 2.5 MG/1
2.5 TABLET, FILM COATED ORAL DAILY
COMMUNITY
End: 2023-06-07

## 2022-05-30 RX ORDER — ATORVASTATIN CALCIUM 40 MG/1
40 TABLET, FILM COATED ORAL DAILY
COMMUNITY

## 2022-05-30 RX ORDER — ISOSORBIDE MONONITRATE 30 MG/1
30 TABLET, EXTENDED RELEASE ORAL DAILY
COMMUNITY

## 2022-05-30 RX ORDER — CALCIUM CARBONATE 600 MG
1200 TABLET ORAL ONCE
COMMUNITY

## 2022-05-30 RX ORDER — CLOPIDOGREL BISULFATE 75 MG/1
75 TABLET ORAL DAILY
COMMUNITY

## 2022-05-30 RX ORDER — OMEPRAZOLE 20 MG/1
20 CAPSULE, DELAYED RELEASE ORAL DAILY
COMMUNITY

## 2022-05-30 RX ORDER — ACETAMINOPHEN AND PHENYLEPHRINE HCL 325; 5 MG/1; MG/1
5000 TABLET ORAL DAILY
COMMUNITY

## 2022-05-30 RX ORDER — ALENDRONATE SODIUM 70 MG/1
70 TABLET ORAL
COMMUNITY
Start: 2021-12-28 | End: 2022-06-27

## 2022-05-30 RX ORDER — LEVOTHYROXINE SODIUM 75 UG/1
75 TABLET ORAL
COMMUNITY

## 2022-05-30 RX ORDER — DEXAMETHASONE 4 MG/1
2 TABLET ORAL 2 TIMES DAILY
COMMUNITY
Start: 2021-12-02

## 2022-05-30 RX ORDER — IRBESARTAN 150 MG/1
150 TABLET ORAL NIGHTLY
COMMUNITY

## 2022-05-31 DIAGNOSIS — M81.0 OSTEOPOROSIS, UNSPECIFIED OSTEOPOROSIS TYPE, UNSPECIFIED PATHOLOGICAL FRACTURE PRESENCE: Primary | ICD-10-CM

## 2022-05-31 DIAGNOSIS — C50.912 PRIMARY CANCER OF LEFT FEMALE BREAST: ICD-10-CM

## 2022-06-03 ENCOUNTER — LAB VISIT (OUTPATIENT)
Dept: LAB | Facility: HOSPITAL | Age: 62
End: 2022-06-03
Attending: NURSE PRACTITIONER
Payer: COMMERCIAL

## 2022-06-03 DIAGNOSIS — M81.0 OSTEOPOROSIS, UNSPECIFIED OSTEOPOROSIS TYPE, UNSPECIFIED PATHOLOGICAL FRACTURE PRESENCE: ICD-10-CM

## 2022-06-03 DIAGNOSIS — C50.912 PRIMARY CANCER OF LEFT FEMALE BREAST: ICD-10-CM

## 2022-06-03 LAB
ALBUMIN SERPL-MCNC: 3.9 GM/DL (ref 3.5–5)
ALBUMIN/GLOB SERPL: 1.5 RATIO (ref 1.1–2)
ALP SERPL-CCNC: 133 UNIT/L (ref 40–150)
ALT SERPL-CCNC: 18 UNIT/L (ref 0–55)
AST SERPL-CCNC: 18 UNIT/L (ref 5–34)
BASOPHILS # BLD AUTO: 0.04 X10(3)/MCL (ref 0–0.2)
BASOPHILS NFR BLD AUTO: 0.7 %
BILIRUBIN DIRECT+TOT PNL SERPL-MCNC: 0.7 MG/DL
BUN SERPL-MCNC: 16 MG/DL (ref 9.8–20.1)
CALCIUM SERPL-MCNC: 9.7 MG/DL (ref 8.4–10.2)
CHLORIDE SERPL-SCNC: 109 MMOL/L (ref 98–107)
CO2 SERPL-SCNC: 22 MMOL/L (ref 22–29)
CREAT SERPL-MCNC: 0.71 MG/DL (ref 0.55–1.02)
DEPRECATED CALCIDIOL+CALCIFEROL SERPL-MC: 46.5 NG/ML (ref 30–80)
EOSINOPHIL # BLD AUTO: 0.18 X10(3)/MCL (ref 0–0.9)
EOSINOPHIL NFR BLD AUTO: 3 %
ERYTHROCYTE [DISTWIDTH] IN BLOOD BY AUTOMATED COUNT: 12.9 % (ref 11.5–17)
GLOBULIN SER-MCNC: 2.6 GM/DL (ref 2.4–3.5)
GLUCOSE SERPL-MCNC: 104 MG/DL (ref 74–100)
HCT VFR BLD AUTO: 44 % (ref 37–47)
HGB BLD-MCNC: 14.4 GM/DL (ref 12–16)
IMM GRANULOCYTES # BLD AUTO: 0.01 X10(3)/MCL (ref 0–0.02)
IMM GRANULOCYTES NFR BLD AUTO: 0.2 % (ref 0–0.43)
LYMPHOCYTES # BLD AUTO: 2.14 X10(3)/MCL (ref 0.6–4.6)
LYMPHOCYTES NFR BLD AUTO: 35.5 %
MCH RBC QN AUTO: 29.1 PG (ref 27–31)
MCHC RBC AUTO-ENTMCNC: 32.7 MG/DL (ref 33–36)
MCV RBC AUTO: 88.9 FL (ref 80–94)
MONOCYTES # BLD AUTO: 0.43 X10(3)/MCL (ref 0.1–1.3)
MONOCYTES NFR BLD AUTO: 7.1 %
NEUTROPHILS # BLD AUTO: 3.2 X10(3)/MCL (ref 2.1–9.2)
NEUTROPHILS NFR BLD AUTO: 53.5 %
PLATELET # BLD AUTO: 246 X10(3)/MCL (ref 130–400)
PMV BLD AUTO: 10.9 FL (ref 9.4–12.4)
POTASSIUM SERPL-SCNC: 4.1 MMOL/L (ref 3.5–5.1)
PROT SERPL-MCNC: 6.5 GM/DL (ref 6.4–8.3)
RBC # BLD AUTO: 4.95 X10(6)/MCL (ref 4.2–5.4)
SODIUM SERPL-SCNC: 141 MMOL/L (ref 136–145)
WBC # SPEC AUTO: 6 X10(3)/MCL (ref 4.5–11.5)

## 2022-06-03 PROCEDURE — 36415 COLL VENOUS BLD VENIPUNCTURE: CPT

## 2022-06-03 PROCEDURE — 80053 COMPREHEN METABOLIC PANEL: CPT

## 2022-06-03 PROCEDURE — 82306 VITAMIN D 25 HYDROXY: CPT

## 2022-06-03 PROCEDURE — 85025 COMPLETE CBC W/AUTO DIFF WBC: CPT

## 2022-06-07 ENCOUNTER — OFFICE VISIT (OUTPATIENT)
Dept: HEMATOLOGY/ONCOLOGY | Facility: CLINIC | Age: 62
End: 2022-06-07
Payer: COMMERCIAL

## 2022-06-07 VITALS
OXYGEN SATURATION: 96 % | WEIGHT: 187.63 LBS | BODY MASS INDEX: 35.43 KG/M2 | DIASTOLIC BLOOD PRESSURE: 70 MMHG | RESPIRATION RATE: 16 BRPM | HEIGHT: 61 IN | HEART RATE: 64 BPM | TEMPERATURE: 96 F | SYSTOLIC BLOOD PRESSURE: 144 MMHG

## 2022-06-07 DIAGNOSIS — C50.912 PRIMARY CANCER OF LEFT FEMALE BREAST: Primary | ICD-10-CM

## 2022-06-07 PROCEDURE — 99999 PR PBB SHADOW E&M-EST. PATIENT-LVL V: CPT | Mod: PBBFAC,,, | Performed by: NURSE PRACTITIONER

## 2022-06-07 PROCEDURE — 1160F PR REVIEW ALL MEDS BY PRESCRIBER/CLIN PHARMACIST DOCUMENTED: ICD-10-PCS | Mod: CPTII,S$GLB,, | Performed by: NURSE PRACTITIONER

## 2022-06-07 PROCEDURE — 99214 OFFICE O/P EST MOD 30 MIN: CPT | Mod: S$GLB,,, | Performed by: NURSE PRACTITIONER

## 2022-06-07 PROCEDURE — 3008F PR BODY MASS INDEX (BMI) DOCUMENTED: ICD-10-PCS | Mod: CPTII,S$GLB,, | Performed by: NURSE PRACTITIONER

## 2022-06-07 PROCEDURE — 3077F PR MOST RECENT SYSTOLIC BLOOD PRESSURE >= 140 MM HG: ICD-10-PCS | Mod: CPTII,S$GLB,, | Performed by: NURSE PRACTITIONER

## 2022-06-07 PROCEDURE — 99214 PR OFFICE/OUTPT VISIT, EST, LEVL IV, 30-39 MIN: ICD-10-PCS | Mod: S$GLB,,, | Performed by: NURSE PRACTITIONER

## 2022-06-07 PROCEDURE — 3078F DIAST BP <80 MM HG: CPT | Mod: CPTII,S$GLB,, | Performed by: NURSE PRACTITIONER

## 2022-06-07 PROCEDURE — 3078F PR MOST RECENT DIASTOLIC BLOOD PRESSURE < 80 MM HG: ICD-10-PCS | Mod: CPTII,S$GLB,, | Performed by: NURSE PRACTITIONER

## 2022-06-07 PROCEDURE — 1159F MED LIST DOCD IN RCRD: CPT | Mod: CPTII,S$GLB,, | Performed by: NURSE PRACTITIONER

## 2022-06-07 PROCEDURE — 3008F BODY MASS INDEX DOCD: CPT | Mod: CPTII,S$GLB,, | Performed by: NURSE PRACTITIONER

## 2022-06-07 PROCEDURE — 4010F PR ACE/ARB THEARPY RXD/TAKEN: ICD-10-PCS | Mod: CPTII,S$GLB,, | Performed by: NURSE PRACTITIONER

## 2022-06-07 PROCEDURE — 99999 PR PBB SHADOW E&M-EST. PATIENT-LVL V: ICD-10-PCS | Mod: PBBFAC,,, | Performed by: NURSE PRACTITIONER

## 2022-06-07 PROCEDURE — 3077F SYST BP >= 140 MM HG: CPT | Mod: CPTII,S$GLB,, | Performed by: NURSE PRACTITIONER

## 2022-06-07 PROCEDURE — 1160F RVW MEDS BY RX/DR IN RCRD: CPT | Mod: CPTII,S$GLB,, | Performed by: NURSE PRACTITIONER

## 2022-06-07 PROCEDURE — 4010F ACE/ARB THERAPY RXD/TAKEN: CPT | Mod: CPTII,S$GLB,, | Performed by: NURSE PRACTITIONER

## 2022-06-07 PROCEDURE — 1159F PR MEDICATION LIST DOCUMENTED IN MEDICAL RECORD: ICD-10-PCS | Mod: CPTII,S$GLB,, | Performed by: NURSE PRACTITIONER

## 2022-06-07 NOTE — PROGRESS NOTES
[facility] [place] Hematology/Oncology  PROGRESS NOTE      Subjective:         NAME: Shelly Malik : 1960     61 y.o. female   MRN: [id number]      Chief Complaint:    Chief Complaint   Patient presents with    Breast Cancer     F/u with labs--- Pt reports no new concerns         Current Treatment : completedFemara 2022     History of Present Illness:   Ms. Malik is a 61 yo WF referred to oncology for newly diagnosed stage I breast cancer. She had an abnormal mammogram on 16 that described a nodular focus in the upper outer left breast. US done 16 confirmed a suspicious solid nodule in the mid lateral left breast. Dr. Tse performed a biopsy on 16 and pathology revealed invasive ductal carcinoma, well differentiated. ER and CO studies were positive and Her2 negative. She then went on to have a left lumpectomy with sentinel node biopsy. Final path with a 1.7 cm, grade 1, invasive ductal carcinoma.     Left breast cancer, [id number]. Diagnosed 2016.   Left breast biopsy 16: Invasive ductal carcinoma, well differentiated   ER 95%, CO 95%, Her2 1+ (neg)   Oncotype RS = 6   Genetic testing NEGATIVE    Treatment History:   Adjuvant radiation completed 1/3/17   Prolia q 6 months 17 (x 1 dose; stopped secondary to cost)      Interval History:  Pt is here for 6 month follow up, here alone.   She continues with hot flashes, no night sweats. She cont with joint pains specifically L knee & hip. She is on Ca + Vit D daily. Denies any changes to breast, no nipple discharge/bleeding, weight loss. Denies shortness of breath, chest pain, headaches or dizziness.     ROS:   Review of Systems   Constitutional: Negative.    HENT: Negative.    Eyes: Negative.    Respiratory: Negative.    Cardiovascular: Negative.    Gastrointestinal: Negative.    Genitourinary: Negative.    Musculoskeletal: Positive for joint pain.   Skin: Negative.    Neurological: Negative.         Hotflashes   Endo/Heme/Allergies: Negative.     Psychiatric/Behavioral: Negative.      Allergies:  Review of patient's allergies indicates:  No Known Allergies    Medications:    Current Outpatient Medications:     alendronate (FOSAMAX) 70 MG tablet, Take 70 mg by mouth every 7 days., Disp: , Rfl:     atorvastatin (LIPITOR) 40 MG tablet, Take 40 mg by mouth once daily., Disp: , Rfl:     calcium carbonate (OS-VARUN) 600 mg calcium (1,500 mg) Tab, Take 1,200 mg by mouth once., Disp: , Rfl:     cholecalciferol, vitamin D3, 125 mcg (5,000 unit) TbDL, Take 5,000 Units by mouth once daily., Disp: , Rfl:     clopidogreL (PLAVIX) 75 mg tablet, Take 75 mg by mouth once daily., Disp: , Rfl:     fluticasone propionate (FLOVENT HFA) 110 mcg/actuation inhaler, Inhale 2 puffs into the lungs 2 (two) times a day., Disp: , Rfl:     irbesartan (AVAPRO) 150 MG tablet, Take 150 mg by mouth every evening., Disp: , Rfl:     isosorbide mononitrate (IMDUR) 30 MG 24 hr tablet, Take 30 mg by mouth once daily., Disp: , Rfl:     levothyroxine (SYNTHROID) 75 MCG tablet, Take 75 mcg by mouth before breakfast., Disp: , Rfl:     metoprolol succinate 25 mg CSpX, Take 25 mg by mouth once daily at 6am., Disp: , Rfl:     omeprazole (PRILOSEC) 20 MG capsule, Take 20 mg by mouth once daily., Disp: , Rfl:     letrozole (FEMARA) 2.5 mg Tab, Take 2.5 mg by mouth once daily., Disp: , Rfl:     PMHx/PSHx Updates:  Past Medical History:   Diagnosis Date    Abnormal liver enzymes     Abnormal mammogram     Intraductal papilloma     Invasive ductal carcinoma of left breast     Long term (current) use of aromatase inhibitors     Osteoporosis     PAD (peripheral artery disease)     Primary cancer of left female breast     Vitamin deficiency      Past Surgical History:   Procedure Laterality Date    APPENDECTOMY      BREAST LUMPECTOMY Left     TOTAL ABDOMINAL HYSTERECTOMY         Family History:   Family History   Problem Relation Age of Onset    Heart failure Mother     Heart disease  "Mother     Heart disease Father     Heart failure Father     Colon cancer Sister     Diabetes Mellitus Sister     Diabetes Mellitus Maternal Grandmother     Heart disease Maternal Grandmother     Lung cancer Maternal Uncle        Social History:   Social History     Socioeconomic History    Marital status:    Tobacco Use    Smoking status: Former Smoker    Smokeless tobacco: Never Used   Substance and Sexual Activity    Alcohol use: Not Currently     Comment: quit in 2013    Drug use: Never         Pathology:  Left lumpectomy with SLNB 10/24/16   Invasive ductal carcinoma, 1.7 cm, grade 1. 1 lymph node negative.         Objective:     Vitals:  Blood pressure (!) 144/70, pulse 64, temperature 96.3 °F (35.7 °C), resp. rate 16, height 5' 0.91" (1.547 m), weight 85.1 kg (187 lb 9.6 oz), SpO2 96 %.    Physical Examination:   Physical Exam  Vitals reviewed.   Constitutional:       Appearance: Normal appearance.   HENT:      Head: Normocephalic and atraumatic.      Right Ear: External ear normal.      Left Ear: External ear normal.      Nose: Nose normal.      Mouth/Throat:      Mouth: Mucous membranes are dry.      Pharynx: Oropharynx is clear.   Eyes:      Extraocular Movements: Extraocular movements intact.      Conjunctiva/sclera: Conjunctivae normal.      Pupils: Pupils are equal, round, and reactive to light.   Cardiovascular:      Rate and Rhythm: Normal rate and regular rhythm.      Pulses: Normal pulses.      Heart sounds: Normal heart sounds.   Pulmonary:      Effort: Pulmonary effort is normal.      Breath sounds: Normal breath sounds.   Abdominal:      General: Abdomen is flat. Bowel sounds are normal.      Palpations: Abdomen is soft.   Musculoskeletal:         General: Normal range of motion.      Cervical back: Normal range of motion and neck supple.   Skin:     General: Skin is warm and dry.   Neurological:      General: No focal deficit present.      Mental Status: She is alert and " oriented to person, place, and time.   Psychiatric:         Mood and Affect: Mood normal.         Behavior: Behavior normal.         Thought Content: Thought content normal.         Judgment: Judgment normal.      Breasts: breasts appear normal, no suspicious masses, no skin or nipple changes or axillary nodes, left breast s/p lumpectomy    Labs:   Lab Visit on 06/03/2022   Component Date Value Ref Range Status    Sodium Level 06/03/2022 141  136 - 145 mmol/L Final    Potassium Level 06/03/2022 4.1  3.5 - 5.1 mmol/L Final    Chloride 06/03/2022 109 (A) 98 - 107 mmol/L Final    Carbon Dioxide 06/03/2022 22  22 - 29 mmol/L Final    Glucose Level 06/03/2022 104 (A) 74 - 100 mg/dL Final    Blood Urea Nitrogen 06/03/2022 16.0  9.8 - 20.1 mg/dL Final    Creatinine 06/03/2022 0.71  0.55 - 1.02 mg/dL Final    Calcium Level Total 06/03/2022 9.7  8.4 - 10.2 mg/dL Final    Protein Total 06/03/2022 6.5  6.4 - 8.3 gm/dL Final    Albumin Level 06/03/2022 3.9  3.5 - 5.0 gm/dL Final    Globulin 06/03/2022 2.6  2.4 - 3.5 gm/dL Final    Albumin/Globulin Ratio 06/03/2022 1.5  1.1 - 2.0 ratio Final    Bilirubin Total 06/03/2022 0.7  <=1.5 mg/dL Final    Alkaline Phosphatase 06/03/2022 133  40 - 150 unit/L Final    Alanine Aminotransferase 06/03/2022 18  0 - 55 unit/L Final    Aspartate Aminotransferase 06/03/2022 18  5 - 34 unit/L Final    Estimated GFR-Non  06/03/2022 >60  mls/min/1.73/m2 Final    Vit D 25 OH 06/03/2022 46.5  30.0 - 80.0 ng/mL Final    WBC 06/03/2022 6.0  4.5 - 11.5 x10(3)/mcL Final    RBC 06/03/2022 4.95  4.20 - 5.40 x10(6)/mcL Final    Hgb 06/03/2022 14.4  12.0 - 16.0 gm/dL Final    Hct 06/03/2022 44.0  37.0 - 47.0 % Final    MCV 06/03/2022 88.9  80.0 - 94.0 fL Final    MCH 06/03/2022 29.1  27.0 - 31.0 pg Final    MCHC 06/03/2022 32.7 (A) 33.0 - 36.0 mg/dL Final    RDW 06/03/2022 12.9  11.5 - 17.0 % Final    Platelet 06/03/2022 246  130 - 400 x10(3)/mcL Final    MPV  06/03/2022 10.9  9.4 - 12.4 fL Final    Neut % 06/03/2022 53.5  % Final    Lymph % 06/03/2022 35.5  % Final    Mono % 06/03/2022 7.1  % Final    Eos % 06/03/2022 3.0  % Final    Basophil % 06/03/2022 0.7  % Final    Lymph # 06/03/2022 2.14  0.6 - 4.6 x10(3)/mcL Final    Neut # 06/03/2022 3.2  2.1 - 9.2 x10(3)/mcL Final    Mono # 06/03/2022 0.43  0.1 - 1.3 x10(3)/mcL Final    Eos # 06/03/2022 0.18  0 - 0.9 x10(3)/mcL Final    Baso # 06/03/2022 0.04  0 - 0.2 x10(3)/mcL Final    IG# 06/03/2022 0.01  0 - 0.0155 x10(3)/mcL Final    IG% 06/03/2022 0.2  0 - 0.43 % Final       Radiology/Diagnostic Studies:  DEXA 1/13/17: Osteoporosis with T score -3.0 (femoral neck) and -2.8 (lumbar spine)   NM Bone scan 3/10/17: No significant abnormal focus of increased activity identified to indicate bony metastatic disease. Arthritic changes noted.   Mammogram 9/19/18: Postsurgical changes noted left upper outer breast. Otherwise unremarkable.   DEXA 1/25/19: L spine with T score = -2.9, Left hip with T score -2.2.   Mammogram 9/23/19: Benign   MMG 10/2020: Benign  DEXA 1/25/21   1. Osteoporosis of the lumbar spine which has slightly improved since   the prior exam   2. Osteopenia of the left hip which has slightly improved since the   prior exam   MMG 10/20/21 no mammographic evidence of malignancy in either breast. No significant change. Stable post lumpectomy changes of the left breast.       Assessment/Plan:   Stage I breast cancer, ER/MT positive, Her2 negative. Genetic testing negative. S/p lumpectomy with SLNB followed by adjuvant radiation (completed 1/2017). Oncotype recurrence score was 6 which puts her in a low risk category thus endocrine therapy alone was recommended. She started Femara in Jan 2017 and continues to tolerate it well. She has hot flashes, joint pains, osteoporosis. Recommend stopping AI after 5y. pT stopped 1/2022 per MD recommendation from last visit    Last mammo - 10/2021   2. Osteoporosis -  last BMD 01/2021 showed osteoporosis of LS and Lt hip which is slightly improved from last exam. States taking weekly Fosamax and tolerating. [She did have trial Prolia injections, received 2/2020 however patient had to pay out-of-pocket expense and could not afford it].  She will follow-up with PCP for continued refills and DEXA scans since she is off AI, will give refill of fosamax  until her next appointment with PCP    Heartburn symptoms better with PPI. Cont ca + Vit d.   3. S/p colonoscopy 05/10/21 - s/p polypectomy - path results pending. Recommend colonoscopy in 3 years (05/2023). Continue f/u with GI as scheduled.        PLAN:  RTC 1  Yr with MD ESCOBAR, mammogram (10/2022)  prior     Discussion:     I have explained all of the above in detail and the patient understands all of the current recommendation(s). I have answered all of their questions to the best of my ability and to their complete satisfaction. Pt instructed to call in the interim for any new or worsening concerns.    The patient is to continue with the current management plan.            Electronically signed by Yeny Peralta NP

## 2022-06-26 DIAGNOSIS — M81.0 OSTEOPOROSIS, UNSPECIFIED OSTEOPOROSIS TYPE, UNSPECIFIED PATHOLOGICAL FRACTURE PRESENCE: Primary | ICD-10-CM

## 2022-06-27 RX ORDER — ALENDRONATE SODIUM 70 MG/1
TABLET ORAL
Qty: 4 TABLET | Refills: 0 | Status: SHIPPED | OUTPATIENT
Start: 2022-06-27

## 2022-09-16 DIAGNOSIS — Z12.31 SCREENING MAMMOGRAM FOR HIGH-RISK PATIENT: Primary | ICD-10-CM

## 2022-09-27 ENCOUNTER — TELEPHONE (OUTPATIENT)
Dept: HEMATOLOGY/ONCOLOGY | Facility: CLINIC | Age: 62
End: 2022-09-27
Payer: COMMERCIAL

## 2022-09-27 NOTE — TELEPHONE ENCOUNTER
----- Message from Peyton Avila sent at 9/23/2022 12:09 PM CDT -----  Regarding: RE: JOSHUA  Mammo scheduled 10/25/22 @ [facility] w/ 8:00 arrival    Appt confirmed w/ patient  ----- Message -----  From: Sakshi Nieto  Sent: 9/15/2022   3:20 PM CDT  To: Peyton Avila  Subject: MMG                                              Patient needs MMG. See ORder

## 2022-11-22 ENCOUNTER — HOSPITAL ENCOUNTER (OUTPATIENT)
Dept: RADIOLOGY | Facility: HOSPITAL | Age: 62
Discharge: HOME | End: 2022-11-22
Payer: COMMERCIAL

## 2022-11-22 DIAGNOSIS — Z12.31 SCREENING MAMMOGRAM FOR HIGH-RISK PATIENT: ICD-10-CM

## 2022-11-22 PROCEDURE — 77063 MAMMO DIGITAL SCREENING BILAT WITH TOMO: ICD-10-PCS | Mod: 26,,, | Performed by: STUDENT IN AN ORGANIZED HEALTH CARE EDUCATION/TRAINING PROGRAM

## 2022-11-22 PROCEDURE — 77063 BREAST TOMOSYNTHESIS BI: CPT | Mod: 26,,, | Performed by: STUDENT IN AN ORGANIZED HEALTH CARE EDUCATION/TRAINING PROGRAM

## 2022-11-22 PROCEDURE — 77063 BREAST TOMOSYNTHESIS BI: CPT | Mod: TC

## 2022-11-22 PROCEDURE — 77067 MAMMO DIGITAL SCREENING BILAT WITH TOMO: ICD-10-PCS | Mod: 26,,, | Performed by: STUDENT IN AN ORGANIZED HEALTH CARE EDUCATION/TRAINING PROGRAM

## 2022-11-22 PROCEDURE — 77067 SCR MAMMO BI INCL CAD: CPT | Mod: 26,,, | Performed by: STUDENT IN AN ORGANIZED HEALTH CARE EDUCATION/TRAINING PROGRAM

## 2022-12-23 ENCOUNTER — DOCUMENTATION ONLY (OUTPATIENT)
Dept: ADMINISTRATIVE | Facility: HOSPITAL | Age: 62
End: 2022-12-23
Payer: COMMERCIAL

## 2023-06-05 ENCOUNTER — LAB VISIT (OUTPATIENT)
Dept: LAB | Facility: HOSPITAL | Age: 63
End: 2023-06-05
Payer: COMMERCIAL

## 2023-06-05 DIAGNOSIS — C50.912 PRIMARY CANCER OF LEFT FEMALE BREAST: ICD-10-CM

## 2023-06-05 LAB
ALBUMIN SERPL-MCNC: 3.8 G/DL (ref 3.4–4.8)
ALBUMIN/GLOB SERPL: 1.3 RATIO (ref 1.1–2)
ALP SERPL-CCNC: 108 UNIT/L (ref 40–150)
ALT SERPL-CCNC: 17 UNIT/L (ref 0–55)
AST SERPL-CCNC: 19 UNIT/L (ref 5–34)
BILIRUBIN DIRECT+TOT PNL SERPL-MCNC: 0.7 MG/DL
BUN SERPL-MCNC: 10 MG/DL (ref 9.8–20.1)
CALCIUM SERPL-MCNC: 9.7 MG/DL (ref 8.4–10.2)
CHLORIDE SERPL-SCNC: 107 MMOL/L (ref 98–107)
CO2 SERPL-SCNC: 28 MMOL/L (ref 23–31)
CREAT SERPL-MCNC: 0.82 MG/DL (ref 0.55–1.02)
GFR SERPLBLD CREATININE-BSD FMLA CKD-EPI: >60 MLS/MIN/1.73/M2
GLOBULIN SER-MCNC: 2.9 GM/DL (ref 2.4–3.5)
GLUCOSE SERPL-MCNC: 121 MG/DL (ref 82–115)
POTASSIUM SERPL-SCNC: 4.1 MMOL/L (ref 3.5–5.1)
PROT SERPL-MCNC: 6.7 GM/DL (ref 5.8–7.6)
SODIUM SERPL-SCNC: 143 MMOL/L (ref 136–145)

## 2023-06-05 PROCEDURE — 36415 COLL VENOUS BLD VENIPUNCTURE: CPT

## 2023-06-05 PROCEDURE — 80053 COMPREHEN METABOLIC PANEL: CPT

## 2023-06-07 ENCOUNTER — OFFICE VISIT (OUTPATIENT)
Dept: HEMATOLOGY/ONCOLOGY | Facility: CLINIC | Age: 63
End: 2023-06-07
Payer: COMMERCIAL

## 2023-06-07 VITALS
HEART RATE: 64 BPM | TEMPERATURE: 99 F | DIASTOLIC BLOOD PRESSURE: 83 MMHG | RESPIRATION RATE: 20 BRPM | WEIGHT: 182.38 LBS | SYSTOLIC BLOOD PRESSURE: 147 MMHG | OXYGEN SATURATION: 96 % | BODY MASS INDEX: 33.56 KG/M2 | HEIGHT: 62 IN

## 2023-06-07 DIAGNOSIS — Z12.31 SCREENING MAMMOGRAM FOR HIGH-RISK PATIENT: Primary | ICD-10-CM

## 2023-06-07 PROCEDURE — 1160F PR REVIEW ALL MEDS BY PRESCRIBER/CLIN PHARMACIST DOCUMENTED: ICD-10-PCS | Mod: CPTII,S$GLB,, | Performed by: NURSE PRACTITIONER

## 2023-06-07 PROCEDURE — 1159F PR MEDICATION LIST DOCUMENTED IN MEDICAL RECORD: ICD-10-PCS | Mod: CPTII,S$GLB,, | Performed by: NURSE PRACTITIONER

## 2023-06-07 PROCEDURE — 99214 PR OFFICE/OUTPT VISIT, EST, LEVL IV, 30-39 MIN: ICD-10-PCS | Mod: S$GLB,,, | Performed by: NURSE PRACTITIONER

## 2023-06-07 PROCEDURE — 3008F BODY MASS INDEX DOCD: CPT | Mod: CPTII,S$GLB,, | Performed by: NURSE PRACTITIONER

## 2023-06-07 PROCEDURE — 99999 PR PBB SHADOW E&M-EST. PATIENT-LVL IV: ICD-10-PCS | Mod: PBBFAC,,, | Performed by: NURSE PRACTITIONER

## 2023-06-07 PROCEDURE — 1160F RVW MEDS BY RX/DR IN RCRD: CPT | Mod: CPTII,S$GLB,, | Performed by: NURSE PRACTITIONER

## 2023-06-07 PROCEDURE — 4010F PR ACE/ARB THEARPY RXD/TAKEN: ICD-10-PCS | Mod: CPTII,S$GLB,, | Performed by: NURSE PRACTITIONER

## 2023-06-07 PROCEDURE — 1159F MED LIST DOCD IN RCRD: CPT | Mod: CPTII,S$GLB,, | Performed by: NURSE PRACTITIONER

## 2023-06-07 PROCEDURE — 3079F PR MOST RECENT DIASTOLIC BLOOD PRESSURE 80-89 MM HG: ICD-10-PCS | Mod: CPTII,S$GLB,, | Performed by: NURSE PRACTITIONER

## 2023-06-07 PROCEDURE — 3008F PR BODY MASS INDEX (BMI) DOCUMENTED: ICD-10-PCS | Mod: CPTII,S$GLB,, | Performed by: NURSE PRACTITIONER

## 2023-06-07 PROCEDURE — 99999 PR PBB SHADOW E&M-EST. PATIENT-LVL IV: CPT | Mod: PBBFAC,,, | Performed by: NURSE PRACTITIONER

## 2023-06-07 PROCEDURE — 3077F SYST BP >= 140 MM HG: CPT | Mod: CPTII,S$GLB,, | Performed by: NURSE PRACTITIONER

## 2023-06-07 PROCEDURE — 3079F DIAST BP 80-89 MM HG: CPT | Mod: CPTII,S$GLB,, | Performed by: NURSE PRACTITIONER

## 2023-06-07 PROCEDURE — 3077F PR MOST RECENT SYSTOLIC BLOOD PRESSURE >= 140 MM HG: ICD-10-PCS | Mod: CPTII,S$GLB,, | Performed by: NURSE PRACTITIONER

## 2023-06-07 PROCEDURE — 99214 OFFICE O/P EST MOD 30 MIN: CPT | Mod: S$GLB,,, | Performed by: NURSE PRACTITIONER

## 2023-06-07 PROCEDURE — 4010F ACE/ARB THERAPY RXD/TAKEN: CPT | Mod: CPTII,S$GLB,, | Performed by: NURSE PRACTITIONER

## 2023-06-07 NOTE — PROGRESS NOTES
[facility] Hematology/Oncology  PROGRESS NOTE      Subjective:         NAME: Shelly Malik : 1960     62 y.o. female   MRN: [id number]      Chief Complaint:    Chief Complaint   Patient presents with    Breast Cancer     1 yr f/u with labs-- Patient reports no new concerns        History of Present Illness:   Ms. Malik is a 61 yo WF referred to oncology for stage I breast cancer. She had an abnormal mammogram on 16 that described a nodular focus in the upper outer left breast. US done 16 confirmed a suspicious solid nodule in the mid lateral left breast. Dr. Tse performed a biopsy on 16 and pathology revealed invasive ductal carcinoma, well differentiated. ER and PA studies were positive and Her2 negative. She then went on to have a left lumpectomy with sentinel node biopsy. Final path with a 1.7 cm, grade 1, invasive ductal carcinoma.     Left breast cancer, [id number]. Diagnosed 2016.   Left breast biopsy 16: Invasive ductal carcinoma, well differentiated   ER 95%, PA 95%, Her2 1+ (neg)   Oncotype RS = 6   Genetic testing NEGATIVE    Treatment History:   Adjuvant radiation completed 1/3/17   Prolia q 6 months 17 (x 1 dose; stopped secondary to cost)  Completed Femara 2022       Interval History:  Ms. Malik presents today for 6 month follow up,she reports feeling well. She continues with hot flashes, no night sweats. She cont with joint pains specifically L knee & hip. She is on Ca + Vit D daily. She denies any changes to breast, no nipple discharge/bleeding, weight loss. Denies shortness of breath, chest pain, headaches or dizziness. She continues on Fosmax through her PCP.     ROS:   Review of Systems   Constitutional: Negative.    HENT: Negative.     Eyes: Negative.    Respiratory: Negative.     Cardiovascular: Negative.    Gastrointestinal: Negative.    Genitourinary: Negative.    Musculoskeletal:  Positive for joint pain.   Skin: Negative.    Neurological: Negative.          Hotflashes   Endo/Heme/Allergies: Negative.    Psychiatric/Behavioral: Negative.     Allergies:  Review of patient's allergies indicates:  No Known Allergies    Medications:    Current Outpatient Medications:     alendronate (FOSAMAX) 70 MG tablet, Take 1 tablet by mouth once a week, Disp: 4 tablet, Rfl: 0    atorvastatin (LIPITOR) 40 MG tablet, Take 40 mg by mouth once daily., Disp: , Rfl:     calcium carbonate (OS-VARUN) 600 mg calcium (1,500 mg) Tab, Take 1,200 mg by mouth once., Disp: , Rfl:     cholecalciferol, vitamin D3, 125 mcg (5,000 unit) TbDL, Take 5,000 Units by mouth once daily., Disp: , Rfl:     clopidogreL (PLAVIX) 75 mg tablet, Take 75 mg by mouth once daily., Disp: , Rfl:     fluticasone propionate (FLOVENT HFA) 110 mcg/actuation inhaler, Inhale 2 puffs into the lungs 2 (two) times a day., Disp: , Rfl:     irbesartan (AVAPRO) 150 MG tablet, Take 150 mg by mouth every evening., Disp: , Rfl:     isosorbide mononitrate (IMDUR) 30 MG 24 hr tablet, Take 30 mg by mouth once daily., Disp: , Rfl:     levothyroxine (SYNTHROID) 75 MCG tablet, Take 75 mcg by mouth before breakfast., Disp: , Rfl:     metoprolol succinate 25 mg CSpX, Take 25 mg by mouth once daily at 6am., Disp: , Rfl:     omeprazole (PRILOSEC) 20 MG capsule, Take 20 mg by mouth once daily., Disp: , Rfl:     PMHx/PSHx Updates:  Past Medical History:   Diagnosis Date    Abnormal liver enzymes     Abnormal mammogram     Intraductal papilloma     Invasive ductal carcinoma of left breast     Long term (current) use of aromatase inhibitors     Osteoporosis     PAD (peripheral artery disease)     Personal history of colonic polyps     Primary cancer of left female breast     Vitamin deficiency      Past Surgical History:   Procedure Laterality Date    APPENDECTOMY      BREAST LUMPECTOMY Left     COLONOSCOPY  05/10/2021    TOTAL ABDOMINAL HYSTERECTOMY         Family History:   Family History   Problem Relation Age of Onset    Heart failure Mother      "Heart disease Mother     Heart disease Father     Heart failure Father     Colon cancer Sister     Diabetes Mellitus Sister     Diabetes Mellitus Maternal Grandmother     Heart disease Maternal Grandmother     Lung cancer Maternal Uncle        Social History:   Social History     Socioeconomic History    Marital status:    Tobacco Use    Smoking status: Former    Smokeless tobacco: Never   Substance and Sexual Activity    Alcohol use: Not Currently     Comment: quit in 2013    Drug use: Never         Pathology:  Left lumpectomy with SLNB 10/24/16   Invasive ductal carcinoma, 1.7 cm, grade 1. 1 lymph node negative.         Objective:     Vitals:  Blood pressure (!) 147/83, pulse 64, temperature 98.5 °F (36.9 °C), resp. rate 20, height 5' 2" (1.575 m), weight 82.7 kg (182 lb 6.4 oz), SpO2 96 %.    Physical Examination:   Physical Exam  Vitals reviewed.   Constitutional:       Appearance: Normal appearance.   HENT:      Head: Normocephalic and atraumatic.      Right Ear: External ear normal.      Left Ear: External ear normal.      Nose: Nose normal.      Mouth/Throat:      Mouth: Mucous membranes are dry.      Pharynx: Oropharynx is clear.   Eyes:      Extraocular Movements: Extraocular movements intact.      Conjunctiva/sclera: Conjunctivae normal.      Pupils: Pupils are equal, round, and reactive to light.   Cardiovascular:      Rate and Rhythm: Normal rate and regular rhythm.      Pulses: Normal pulses.      Heart sounds: Normal heart sounds.   Pulmonary:      Effort: Pulmonary effort is normal.      Breath sounds: Normal breath sounds.   Abdominal:      General: Abdomen is flat. Bowel sounds are normal.      Palpations: Abdomen is soft.   Musculoskeletal:         General: Normal range of motion.      Cervical back: Normal range of motion and neck supple.   Skin:     General: Skin is warm and dry.   Neurological:      General: No focal deficit present.      Mental Status: She is alert and oriented to " person, place, and time.   Psychiatric:         Mood and Affect: Mood normal.         Behavior: Behavior normal.         Thought Content: Thought content normal.         Judgment: Judgment normal.    Breasts: breasts appear normal, no suspicious masses, no skin or nipple changes or axillary nodes, left breast s/p lumpectomy    Labs:   Lab Visit on 06/05/2023   Component Date Value Ref Range Status    Sodium Level 06/05/2023 143  136 - 145 mmol/L Final    Potassium Level 06/05/2023 4.1  3.5 - 5.1 mmol/L Final    Chloride 06/05/2023 107  98 - 107 mmol/L Final    Carbon Dioxide 06/05/2023 28  23 - 31 mmol/L Final    Glucose Level 06/05/2023 121 (H)  82 - 115 mg/dL Final    Blood Urea Nitrogen 06/05/2023 10.0  9.8 - 20.1 mg/dL Final    Creatinine 06/05/2023 0.82  0.55 - 1.02 mg/dL Final    Calcium Level Total 06/05/2023 9.7  8.4 - 10.2 mg/dL Final    Protein Total 06/05/2023 6.7  5.8 - 7.6 gm/dL Final    Albumin Level 06/05/2023 3.8  3.4 - 4.8 g/dL Final    Globulin 06/05/2023 2.9  2.4 - 3.5 gm/dL Final    Albumin/Globulin Ratio 06/05/2023 1.3  1.1 - 2.0 ratio Final    Bilirubin Total 06/05/2023 0.7  <=1.5 mg/dL Final    Alkaline Phosphatase 06/05/2023 108  40 - 150 unit/L Final    Alanine Aminotransferase 06/05/2023 17  0 - 55 unit/L Final    Aspartate Aminotransferase 06/05/2023 19  5 - 34 unit/L Final    eGFR 06/05/2023 >60  mls/min/1.73/m2 Final       Radiology/Diagnostic Studies:  DEXA 1/13/17: Osteoporosis with T score -3.0 (femoral neck) and -2.8 (lumbar spine)   NM Bone scan 3/10/17: No significant abnormal focus of increased activity identified to indicate bony metastatic disease. Arthritic changes noted.   Mammogram 9/19/18: Postsurgical changes noted left upper outer breast. Otherwise unremarkable.   DEXA 1/25/19: L spine with T score = -2.9, Left hip with T score -2.2.   Mammogram 9/23/19: Benign   MMG 10/2020: Benign  DEXA 1/25/21   1. Osteoporosis of the lumbar spine which has slightly improved since   the  prior exam   2. Osteopenia of the left hip which has slightly improved since the   prior exam   MMG 10/20/21 no mammographic evidence of malignancy in either breast. No significant change. Stable post lumpectomy changes of the left breast.       Assessment/Plan:   1.Stage I breast cancer, ER/VT positive, Her2 negative. Genetic testing negative. S/p lumpectomy with SLNB followed by adjuvant radiation (completed 1/2017). Oncotype recurrence score was 6 which puts her in a low risk category thus endocrine therapy alone was recommended. She took Femara from Jan 2017 - Jan 2022  She has hot flashes, joint pains, osteoporosis, it was recommend for her to stop AI after 5y.    Last mammo - 11/2022 (negative for malignancy)     2. Osteoporosis - last BMD 01/2021 showed osteoporosis of LS and Lt hip which is slightly improved from last exam. States taking weekly Fosamax and tolerating. [She did have trial Prolia injections, received 2/2020 however patient had to pay out-of-pocket expense and could not afford it].  She will follow-up with PCP for continued refills and DEXA scans since she is off AI.  Cont ca + Vit d.     3. S/p colonoscopy 05/10/21 - s/p polypectomy - path results pending. Recommend colonoscopy in 3 years (05/2024). Continue f/u with GI as scheduled.        PLAN:  RTC 1  Yr with MD  Mammogram (11/2023)      Discussion:     I have explained all of the above in detail and the patient understands all of the current recommendation(s). I have answered all of their questions to the best of my ability and to their complete satisfaction. Pt instructed to call in the interim for any new or worsening concerns.    The patient is to continue with the current management plan.            Electronically signed by Tamanna Bazzi NP

## 2023-11-28 ENCOUNTER — HOSPITAL ENCOUNTER (OUTPATIENT)
Dept: RADIOLOGY | Facility: HOSPITAL | Age: 63
Discharge: HOME | End: 2023-11-28
Attending: NURSE PRACTITIONER
Payer: COMMERCIAL

## 2023-11-28 DIAGNOSIS — Z12.31 SCREENING MAMMOGRAM FOR HIGH-RISK PATIENT: ICD-10-CM

## 2023-11-28 PROCEDURE — 77063 BREAST TOMOSYNTHESIS BI: CPT | Mod: 26,,, | Performed by: STUDENT IN AN ORGANIZED HEALTH CARE EDUCATION/TRAINING PROGRAM

## 2023-11-28 PROCEDURE — 77067 SCR MAMMO BI INCL CAD: CPT | Mod: TC

## 2023-11-28 PROCEDURE — 77063 MAMMO DIGITAL SCREENING BILAT WITH TOMO: ICD-10-PCS | Mod: 26,,, | Performed by: STUDENT IN AN ORGANIZED HEALTH CARE EDUCATION/TRAINING PROGRAM

## 2023-11-28 PROCEDURE — 77067 SCR MAMMO BI INCL CAD: CPT | Mod: 26,,, | Performed by: STUDENT IN AN ORGANIZED HEALTH CARE EDUCATION/TRAINING PROGRAM

## 2023-11-28 PROCEDURE — 77067 MAMMO DIGITAL SCREENING BILAT WITH TOMO: ICD-10-PCS | Mod: 26,,, | Performed by: STUDENT IN AN ORGANIZED HEALTH CARE EDUCATION/TRAINING PROGRAM

## 2024-05-31 DIAGNOSIS — C50.912 PRIMARY CANCER OF LEFT FEMALE BREAST: Primary | ICD-10-CM

## 2024-06-21 ENCOUNTER — LAB VISIT (OUTPATIENT)
Dept: LAB | Facility: HOSPITAL | Age: 64
End: 2024-06-21
Attending: INTERNAL MEDICINE
Payer: COMMERCIAL

## 2024-06-21 DIAGNOSIS — C50.912 PRIMARY CANCER OF LEFT FEMALE BREAST: ICD-10-CM

## 2024-06-21 LAB
ALBUMIN SERPL-MCNC: 3.9 G/DL (ref 3.4–4.8)
ALBUMIN/GLOB SERPL: 1.3 RATIO (ref 1.1–2)
ALP SERPL-CCNC: 95 UNIT/L (ref 40–150)
ALT SERPL-CCNC: 14 UNIT/L (ref 0–55)
ANION GAP SERPL CALC-SCNC: 8 MEQ/L
AST SERPL-CCNC: 17 UNIT/L (ref 5–34)
BASOPHILS # BLD AUTO: 0.05 X10(3)/MCL
BASOPHILS NFR BLD AUTO: 0.8 %
BILIRUB SERPL-MCNC: 0.8 MG/DL
BUN SERPL-MCNC: 13 MG/DL (ref 9.8–20.1)
CALCIUM SERPL-MCNC: 10 MG/DL (ref 8.4–10.2)
CHLORIDE SERPL-SCNC: 108 MMOL/L (ref 98–107)
CO2 SERPL-SCNC: 26 MMOL/L (ref 23–31)
CREAT SERPL-MCNC: 0.77 MG/DL (ref 0.55–1.02)
CREAT/UREA NIT SERPL: 17
EOSINOPHIL # BLD AUTO: 0.43 X10(3)/MCL (ref 0–0.9)
EOSINOPHIL NFR BLD AUTO: 7.2 %
ERYTHROCYTE [DISTWIDTH] IN BLOOD BY AUTOMATED COUNT: 12.9 % (ref 11.5–17)
GFR SERPLBLD CREATININE-BSD FMLA CKD-EPI: >60 ML/MIN/1.73/M2
GLOBULIN SER-MCNC: 3.1 GM/DL (ref 2.4–3.5)
GLUCOSE SERPL-MCNC: 102 MG/DL (ref 82–115)
HCT VFR BLD AUTO: 45 % (ref 37–47)
HGB BLD-MCNC: 15 G/DL (ref 12–16)
IMM GRANULOCYTES # BLD AUTO: 0.01 X10(3)/MCL (ref 0–0.04)
IMM GRANULOCYTES NFR BLD AUTO: 0.2 %
LYMPHOCYTES # BLD AUTO: 2.09 X10(3)/MCL (ref 0.6–4.6)
LYMPHOCYTES NFR BLD AUTO: 35.2 %
MCH RBC QN AUTO: 29 PG (ref 27–31)
MCHC RBC AUTO-ENTMCNC: 33.3 G/DL (ref 33–36)
MCV RBC AUTO: 87 FL (ref 80–94)
MONOCYTES # BLD AUTO: 0.49 X10(3)/MCL (ref 0.1–1.3)
MONOCYTES NFR BLD AUTO: 8.2 %
NEUTROPHILS # BLD AUTO: 2.87 X10(3)/MCL (ref 2.1–9.2)
NEUTROPHILS NFR BLD AUTO: 48.4 %
PLATELET # BLD AUTO: 251 X10(3)/MCL (ref 130–400)
PMV BLD AUTO: 11 FL (ref 7.4–10.4)
POTASSIUM SERPL-SCNC: 3.8 MMOL/L (ref 3.5–5.1)
PROT SERPL-MCNC: 7 GM/DL (ref 5.8–7.6)
RBC # BLD AUTO: 5.17 X10(6)/MCL (ref 4.2–5.4)
SODIUM SERPL-SCNC: 142 MMOL/L (ref 136–145)
WBC # BLD AUTO: 5.94 X10(3)/MCL (ref 4.5–11.5)

## 2024-06-21 PROCEDURE — 36415 COLL VENOUS BLD VENIPUNCTURE: CPT

## 2024-06-21 PROCEDURE — 85025 COMPLETE CBC W/AUTO DIFF WBC: CPT

## 2024-06-21 PROCEDURE — 80053 COMPREHEN METABOLIC PANEL: CPT

## 2024-06-24 NOTE — PROGRESS NOTES
[facility] Hematology/Oncology  PROGRESS NOTE      Subjective:         NAME: Shelly Malik : 1960     63 y.o. female   MRN: [id number]      Chief Complaint:    Chief Complaint   Patient presents with    Breast Cancer     No complaints.       History of Present Illness:   Ms. Malik is a 63 yo WF referred to oncology for stage I breast cancer. She had an abnormal mammogram on 16 that described a nodular focus in the upper outer left breast. US done 16 confirmed a suspicious solid nodule in the mid lateral left breast. Dr. Tse performed a biopsy on 16 and pathology revealed invasive ductal carcinoma, well differentiated. ER and MS studies were positive and Her2 negative. She then went on to have a left lumpectomy with sentinel node biopsy. Final path with a 1.7 cm, grade 1, invasive ductal carcinoma.     Left breast cancer, [id number]. Diagnosed 2016.   Left breast biopsy 16: Invasive ductal carcinoma, well differentiated   ER 95%, MS 95%, Her2 1+ (neg)   Oncotype RS = 6   Genetic testing NEGATIVE    Treatment History:   Adjuvant radiation completed 1/3/17   Prolia q 6 months 17 (x 1 dose; stopped secondary to cost)  Completed Femara 2022       Interval History:  24:  CBC and CMP essentially normal.    Ms. Malik presents today for 6 month follow up,she reports feeling well. She continues with hot flashes, no night sweats. She cont with joint pains specifically L knee & hip. She is on Ca + Vit D daily. She denies any changes to breast, no nipple discharge/bleeding, weight loss. Denies shortness of breath, chest pain, headaches or dizziness. She continues on Fosmax through her PCP.     ROS:   Review of Systems   Constitutional: Negative.    HENT: Negative.     Eyes: Negative.    Respiratory: Negative.  Negative for cough and shortness of breath.    Cardiovascular: Negative.  Negative for chest pain.   Gastrointestinal: Negative.  Negative for abdominal pain and diarrhea.    Genitourinary: Negative.  Negative for frequency.   Musculoskeletal:  Positive for joint pain. Negative for back pain.   Skin: Negative.  Negative for rash.   Neurological: Negative.  Negative for headaches.        Hotflashes   Endo/Heme/Allergies: Negative.    Psychiatric/Behavioral: Negative.  The patient is not nervous/anxious.      Allergies:  Review of patient's allergies indicates:  No Known Allergies    Medications:    Current Outpatient Medications:     alendronate (FOSAMAX) 70 MG tablet, Take 1 tablet by mouth once a week, Disp: 4 tablet, Rfl: 0    atorvastatin (LIPITOR) 40 MG tablet, Take 40 mg by mouth once daily., Disp: , Rfl:     calcium carbonate (OS-VARUN) 600 mg calcium (1,500 mg) Tab, Take 1,200 mg by mouth once., Disp: , Rfl:     cholecalciferol, vitamin D3, 125 mcg (5,000 unit) TbDL, Take 5,000 Units by mouth once daily., Disp: , Rfl:     clopidogreL (PLAVIX) 75 mg tablet, Take 75 mg by mouth once daily., Disp: , Rfl:     irbesartan (AVAPRO) 150 MG tablet, Take 150 mg by mouth every evening., Disp: , Rfl:     isosorbide mononitrate (IMDUR) 30 MG 24 hr tablet, Take 30 mg by mouth once daily., Disp: , Rfl:     levothyroxine (SYNTHROID) 88 MCG tablet, Take 88 mcg by mouth every morning. take on an empty stomach, Disp: , Rfl:     metoprolol succinate (TOPROL-XL) 25 MG 24 hr tablet, Take 25 mg by mouth., Disp: , Rfl:     omeprazole (PRILOSEC) 20 MG capsule, Take 20 mg by mouth once daily., Disp: , Rfl:     fluticasone propionate (FLOVENT HFA) 110 mcg/actuation inhaler, Inhale 2 puffs into the lungs 2 (two) times a day., Disp: , Rfl:     levothyroxine (SYNTHROID) 75 MCG tablet, Take 75 mcg by mouth before breakfast. (Patient not taking: Reported on 6/25/2024), Disp: , Rfl:     metoprolol succinate 25 mg CSpX, Take 25 mg by mouth once daily at 6am. (Patient not taking: Reported on 6/25/2024), Disp: , Rfl:     PMHx/PSHx Updates:  Past Medical History:   Diagnosis Date    Abnormal liver enzymes      "Abnormal mammogram     Intraductal papilloma     Invasive ductal carcinoma of left breast     Long term (current) use of aromatase inhibitors     Osteoporosis     PAD (peripheral artery disease)     Personal history of colonic polyps     Primary cancer of left female breast     Vitamin deficiency      Past Surgical History:   Procedure Laterality Date    APPENDECTOMY      BREAST LUMPECTOMY Left     COLONOSCOPY  05/10/2021    TOTAL ABDOMINAL HYSTERECTOMY         Family History:   Family History   Problem Relation Name Age of Onset    Heart failure Mother      Heart disease Mother      Heart disease Father      Heart failure Father      Colon cancer Sister      Diabetes Mellitus Sister      Diabetes Mellitus Maternal Grandmother      Heart disease Maternal Grandmother      Lung cancer Maternal Uncle         Social History:   Social History     Socioeconomic History    Marital status:    Tobacco Use    Smoking status: Former    Smokeless tobacco: Never   Substance and Sexual Activity    Alcohol use: Not Currently     Comment: quit in 2013    Drug use: Never         Pathology:  Left lumpectomy with SLNB 10/24/16   Invasive ductal carcinoma, 1.7 cm, grade 1. 1 lymph node negative.         Objective:     Vitals:  Blood pressure 137/75, pulse 63, temperature 97.6 °F (36.4 °C), resp. rate 18, height 5' 2.01" (1.575 m), weight 81.6 kg (180 lb), SpO2 (!) 94%.    Physical Examination:   Physical Exam  Vitals reviewed.   Constitutional:       Appearance: Normal appearance.   HENT:      Head: Normocephalic and atraumatic.      Right Ear: External ear normal.      Left Ear: External ear normal.      Nose: Nose normal.      Mouth/Throat:      Mouth: Mucous membranes are dry.      Pharynx: Oropharynx is clear.   Eyes:      Extraocular Movements: Extraocular movements intact.      Conjunctiva/sclera: Conjunctivae normal.      Pupils: Pupils are equal, round, and reactive to light.   Cardiovascular:      Rate and Rhythm: " Normal rate and regular rhythm.      Pulses: Normal pulses.      Heart sounds: Normal heart sounds.   Pulmonary:      Effort: Pulmonary effort is normal.      Breath sounds: Normal breath sounds.   Abdominal:      General: Abdomen is flat. Bowel sounds are normal.      Palpations: Abdomen is soft.   Musculoskeletal:         General: Normal range of motion.      Cervical back: Normal range of motion and neck supple.   Skin:     General: Skin is warm and dry.   Neurological:      General: No focal deficit present.      Mental Status: She is alert and oriented to person, place, and time.   Psychiatric:         Mood and Affect: Mood normal.         Behavior: Behavior normal.         Thought Content: Thought content normal.         Judgment: Judgment normal.      Breasts: breasts appear normal, no suspicious masses, no skin or nipple changes or axillary nodes, left breast s/p lumpectomy    Labs:   Lab Visit on 06/21/2024   Component Date Value Ref Range Status    Sodium 06/21/2024 142  136 - 145 mmol/L Final    Potassium 06/21/2024 3.8  3.5 - 5.1 mmol/L Final    Chloride 06/21/2024 108 (H)  98 - 107 mmol/L Final    CO2 06/21/2024 26  23 - 31 mmol/L Final    Glucose 06/21/2024 102  82 - 115 mg/dL Final    Blood Urea Nitrogen 06/21/2024 13.0  9.8 - 20.1 mg/dL Final    Creatinine 06/21/2024 0.77  0.55 - 1.02 mg/dL Final    Calcium 06/21/2024 10.0  8.4 - 10.2 mg/dL Final    Protein Total 06/21/2024 7.0  5.8 - 7.6 gm/dL Final    Albumin 06/21/2024 3.9  3.4 - 4.8 g/dL Final    Globulin 06/21/2024 3.1  2.4 - 3.5 gm/dL Final    Albumin/Globulin Ratio 06/21/2024 1.3  1.1 - 2.0 ratio Final    Bilirubin Total 06/21/2024 0.8  <=1.5 mg/dL Final    ALP 06/21/2024 95  40 - 150 unit/L Final    ALT 06/21/2024 14  0 - 55 unit/L Final    AST 06/21/2024 17  5 - 34 unit/L Final    eGFR 06/21/2024 >60  mL/min/1.73/m2 Final    Anion Gap 06/21/2024 8.0  mEq/L Final    BUN/Creatinine Ratio 06/21/2024 17   Final    WBC 06/21/2024 5.94  4.50 -  11.50 x10(3)/mcL Final    RBC 06/21/2024 5.17  4.20 - 5.40 x10(6)/mcL Final    Hgb 06/21/2024 15.0  12.0 - 16.0 g/dL Final    Hct 06/21/2024 45.0  37.0 - 47.0 % Final    MCV 06/21/2024 87.0  80.0 - 94.0 fL Final    MCH 06/21/2024 29.0  27.0 - 31.0 pg Final    MCHC 06/21/2024 33.3  33.0 - 36.0 g/dL Final    RDW 06/21/2024 12.9  11.5 - 17.0 % Final    Platelet 06/21/2024 251  130 - 400 x10(3)/mcL Final    MPV 06/21/2024 11.0 (H)  7.4 - 10.4 fL Final    Neut % 06/21/2024 48.4  % Final    Lymph % 06/21/2024 35.2  % Final    Mono % 06/21/2024 8.2  % Final    Eos % 06/21/2024 7.2  % Final    Basophil % 06/21/2024 0.8  % Final    Lymph # 06/21/2024 2.09  0.6 - 4.6 x10(3)/mcL Final    Neut # 06/21/2024 2.87  2.1 - 9.2 x10(3)/mcL Final    Mono # 06/21/2024 0.49  0.1 - 1.3 x10(3)/mcL Final    Eos # 06/21/2024 0.43  0 - 0.9 x10(3)/mcL Final    Baso # 06/21/2024 0.05  <=0.2 x10(3)/mcL Final    IG# 06/21/2024 0.01  0 - 0.04 x10(3)/mcL Final    IG% 06/21/2024 0.2  % Final       Radiology/Diagnostic Studies:  DEXA 1/13/17: Osteoporosis with T score -3.0 (femoral neck) and -2.8 (lumbar spine)   NM Bone scan 3/10/17: No significant abnormal focus of increased activity identified to indicate bony metastatic disease. Arthritic changes noted.   Mammogram 9/19/18: Postsurgical changes noted left upper outer breast. Otherwise unremarkable.   DEXA 1/25/19: L spine with T score = -2.9, Left hip with T score -2.2.   Mammogram 9/23/19: Benign   MMG 10/2020: Benign  DEXA 1/25/21   1. Osteoporosis of the lumbar spine which has slightly improved since   the prior exam   2. Osteopenia of the left hip which has slightly improved since the   prior exam   MMG 10/20/21 no mammographic evidence of malignancy in either breast. No significant change. Stable post lumpectomy changes of the left breast.   MMG 9/29/23 benign    Assessment/Plan:   1.Stage I breast cancer, ER/PA positive, Her2 negative. Genetic testing negative. S/p lumpectomy with SLNB  followed by adjuvant radiation (completed 1/2017). Oncotype recurrence score was 6 which puts her in a low risk category thus endocrine therapy alone was recommended. She took Femara from Jan 2017 - Jan 2022  She has hot flashes, joint pains, osteoporosis, it was recommend for her to stop AI after 5y.    Last mammo - 11/2022 (negative for malignancy)     2. Osteoporosis - last BMD 01/2021 showed osteoporosis of LS and Lt hip which is slightly improved from last exam. States taking weekly Fosamax and tolerating. [She did have trial Prolia injections, received 2/2020 however patient had to pay out-of-pocket expense and could not afford it].  She will follow-up with PCP for continued refills and DEXA scans since she is off AI.  Cont ca + Vit d. Sees her PCP soon and thinks he (Dr. Manjarrez) will order her DEXA.     3. S/p colonoscopy 05/10/21 - s/p polypectomy - path results pending. Recommend colonoscopy in 3 years (05/2024). Continue f/u with GI as scheduled; she has been cleared by cardiology so she is just waiting on an appt.        PLAN:  Patient is 8 years SAIMA. She is getting very good follow up care though her PCP and GI  RTC PRN; patient would like to follow with her PCP for yearly mammograms.   Mammogram due 11/2024     Discussion:     I have explained all of the above in detail and the patient understands all of the current recommendation(s). I have answered all of their questions to the best of my ability and to their complete satisfaction. Pt instructed to call in the interim for any new or worsening concerns.    The patient is to continue with the current management plan.    Electronically signed by Rachelle Ramirez MD          Answers submitted by the patient for this visit:  Review of Systems Questionnaire (Submitted on 6/18/2024)  appetite change : No  unexpected weight change: No  mouth sores: No  visual disturbance: No  adenopathy: No

## 2024-06-25 ENCOUNTER — OFFICE VISIT (OUTPATIENT)
Dept: HEMATOLOGY/ONCOLOGY | Facility: CLINIC | Age: 64
End: 2024-06-25
Payer: COMMERCIAL

## 2024-06-25 VITALS
HEIGHT: 62 IN | SYSTOLIC BLOOD PRESSURE: 137 MMHG | HEART RATE: 63 BPM | BODY MASS INDEX: 33.13 KG/M2 | TEMPERATURE: 98 F | DIASTOLIC BLOOD PRESSURE: 75 MMHG | RESPIRATION RATE: 18 BRPM | OXYGEN SATURATION: 94 % | WEIGHT: 180 LBS

## 2024-06-25 DIAGNOSIS — Z12.31 SCREENING MAMMOGRAM FOR HIGH-RISK PATIENT: ICD-10-CM

## 2024-06-25 DIAGNOSIS — C50.912 PRIMARY CANCER OF LEFT FEMALE BREAST: Primary | ICD-10-CM

## 2024-06-25 PROCEDURE — 1159F MED LIST DOCD IN RCRD: CPT | Mod: CPTII,S$GLB,, | Performed by: INTERNAL MEDICINE

## 2024-06-25 PROCEDURE — 3075F SYST BP GE 130 - 139MM HG: CPT | Mod: CPTII,S$GLB,, | Performed by: INTERNAL MEDICINE

## 2024-06-25 PROCEDURE — 99999 PR PBB SHADOW E&M-EST. PATIENT-LVL IV: CPT | Mod: PBBFAC,,, | Performed by: INTERNAL MEDICINE

## 2024-06-25 PROCEDURE — 99214 OFFICE O/P EST MOD 30 MIN: CPT | Mod: S$GLB,,, | Performed by: INTERNAL MEDICINE

## 2024-06-25 PROCEDURE — 4010F ACE/ARB THERAPY RXD/TAKEN: CPT | Mod: CPTII,S$GLB,, | Performed by: INTERNAL MEDICINE

## 2024-06-25 PROCEDURE — 3008F BODY MASS INDEX DOCD: CPT | Mod: CPTII,S$GLB,, | Performed by: INTERNAL MEDICINE

## 2024-06-25 PROCEDURE — 3078F DIAST BP <80 MM HG: CPT | Mod: CPTII,S$GLB,, | Performed by: INTERNAL MEDICINE

## 2024-06-25 RX ORDER — LEVOTHYROXINE SODIUM 88 UG/1
88 TABLET ORAL EVERY MORNING
COMMUNITY

## 2024-06-25 RX ORDER — METOPROLOL SUCCINATE 25 MG/1
25 TABLET, EXTENDED RELEASE ORAL
COMMUNITY
Start: 2024-04-21

## 2024-07-12 ENCOUNTER — LAB VISIT (OUTPATIENT)
Dept: LAB | Facility: HOSPITAL | Age: 64
End: 2024-07-12
Attending: FAMILY MEDICINE
Payer: COMMERCIAL

## 2024-07-12 DIAGNOSIS — I10 HYPERTENSION, UNSPECIFIED TYPE: ICD-10-CM

## 2024-07-12 DIAGNOSIS — E55.9 VITAMIN D DEFICIENCY: ICD-10-CM

## 2024-07-12 DIAGNOSIS — E78.2 MIXED HYPERLIPIDEMIA: ICD-10-CM

## 2024-07-12 DIAGNOSIS — E03.9 HYPOTHYROIDISM, UNSPECIFIED TYPE: Primary | ICD-10-CM

## 2024-07-12 LAB
25(OH)D3+25(OH)D2 SERPL-MCNC: 74 NG/ML (ref 30–80)
BACTERIA #/AREA URNS AUTO: ABNORMAL /HPF
BILIRUB UR QL STRIP.AUTO: NEGATIVE
CAOX CRY UR QL COMP ASSIST: ABNORMAL
CHOLEST SERPL-MCNC: 150 MG/DL
CHOLEST/HDLC SERPL: 3 {RATIO} (ref 0–5)
CLARITY UR: CLEAR
COLOR UR AUTO: YELLOW
GLUCOSE UR QL STRIP: NEGATIVE
HDLC SERPL-MCNC: 52 MG/DL (ref 35–60)
HGB UR QL STRIP: NEGATIVE
HYALINE CASTS URNS QL MICRO: ABNORMAL /LPF
KETONES UR QL STRIP: NEGATIVE
LDLC SERPL CALC-MCNC: 78 MG/DL (ref 50–140)
LEUKOCYTE ESTERASE UR QL STRIP: ABNORMAL
MUCOUS THREADS URNS QL MICRO: ABNORMAL /LPF
NITRITE UR QL STRIP: NEGATIVE
PH UR STRIP: 5.5 [PH]
PROT UR QL STRIP: NEGATIVE
RBC #/AREA URNS AUTO: ABNORMAL /HPF
SP GR UR STRIP.AUTO: >=1.03 (ref 1–1.03)
SQUAMOUS #/AREA URNS AUTO: ABNORMAL /HPF
T3RU NFR SERPL: 30.4 % (ref 31–39)
T4 SERPL-MCNC: 14.53 UG/DL (ref 4.87–11.72)
TRIGL SERPL-MCNC: 99 MG/DL (ref 37–140)
TSH SERPL-ACNC: 2.71 UIU/ML (ref 0.35–4.94)
UROBILINOGEN UR STRIP-ACNC: 0.2
VLDLC SERPL CALC-MCNC: 20 MG/DL
WBC #/AREA URNS AUTO: ABNORMAL /HPF

## 2024-07-12 PROCEDURE — 36415 COLL VENOUS BLD VENIPUNCTURE: CPT

## 2024-07-12 PROCEDURE — 84479 ASSAY OF THYROID (T3 OR T4): CPT

## 2024-07-12 PROCEDURE — 81001 URINALYSIS AUTO W/SCOPE: CPT

## 2024-07-12 PROCEDURE — 84436 ASSAY OF TOTAL THYROXINE: CPT

## 2024-07-12 PROCEDURE — 84443 ASSAY THYROID STIM HORMONE: CPT

## 2024-07-12 PROCEDURE — 82306 VITAMIN D 25 HYDROXY: CPT

## 2024-07-12 PROCEDURE — 80061 LIPID PANEL: CPT

## 2024-07-23 ENCOUNTER — LAB VISIT (OUTPATIENT)
Dept: LAB | Facility: HOSPITAL | Age: 64
End: 2024-07-23
Attending: FAMILY MEDICINE
Payer: COMMERCIAL

## 2024-07-23 DIAGNOSIS — I51.9 MYXEDEMA HEART DISEASE: Primary | ICD-10-CM

## 2024-07-23 DIAGNOSIS — E03.9 MYXEDEMA HEART DISEASE: Primary | ICD-10-CM

## 2024-07-23 LAB
T4 FREE SERPL-MCNC: 1.54 NG/DL (ref 0.7–1.48)
TSH SERPL-ACNC: 4.68 UIU/ML (ref 0.35–4.94)

## 2024-07-23 PROCEDURE — 84443 ASSAY THYROID STIM HORMONE: CPT

## 2024-07-23 PROCEDURE — 36415 COLL VENOUS BLD VENIPUNCTURE: CPT

## 2024-07-23 PROCEDURE — 84439 ASSAY OF FREE THYROXINE: CPT

## 2024-08-02 DIAGNOSIS — I25.10 CORONARY ATHEROSCLEROSIS OF NATIVE CORONARY ARTERY: Primary | ICD-10-CM

## 2024-08-21 ENCOUNTER — HOSPITAL ENCOUNTER (OUTPATIENT)
Dept: RADIOLOGY | Facility: HOSPITAL | Age: 64
Discharge: HOME | End: 2024-08-21
Attending: INTERNAL MEDICINE
Payer: COMMERCIAL

## 2024-08-21 DIAGNOSIS — I25.10 CORONARY ATHEROSCLEROSIS OF NATIVE CORONARY ARTERY: ICD-10-CM

## 2024-08-21 LAB
APICAL FOUR CHAMBER EJECTION FRACTION: 55 %
APICAL TWO CHAMBER EJECTION FRACTION: 53 %
AV PEAK GRADIENT: 10 MMHG
AV VALVE AREA BY VELOCITY RATIO: 1.93 CM²
AV VELOCITY RATIO: 0.69
CV ECHO LV RWT: 0.54 CM
DOP CALC AO PEAK VEL: 1.6 M/S
DOP CALC LVOT AREA: 2.8 CM2
DOP CALC LVOT DIAMETER: 1.89 CM
DOP CALC LVOT PEAK VEL: 1.1 M/S
DOP CALC MV VTI: 30.9 CM
E WAVE DECELERATION TIME: 248.76 MSEC
E/A RATIO: 1.19
ECHO LV POSTERIOR WALL: 1.17 CM (ref 0.6–1.1)
FRACTIONAL SHORTENING: 29 % (ref 28–44)
INTERVENTRICULAR SEPTUM: 1.03 CM (ref 0.6–1.1)
LEFT ATRIUM SIZE: 0 CM
LEFT INTERNAL DIMENSION IN SYSTOLE: 3.06 CM (ref 2.1–4)
LEFT VENTRICLE DIASTOLIC VOLUME: 83.18 ML
LEFT VENTRICLE END DIASTOLIC VOLUME APICAL 2 CHAMBER: 50.69 ML
LEFT VENTRICLE END DIASTOLIC VOLUME APICAL 4 CHAMBER: 35.62 ML
LEFT VENTRICLE SYSTOLIC VOLUME: 36.87 ML
LEFT VENTRICULAR INTERNAL DIMENSION IN DIASTOLE: 4.3 CM (ref 3.5–6)
LEFT VENTRICULAR MASS: 162.94 G
LVED V (TEICH): 83.18 ML
LVES V (TEICH): 36.87 ML
MV MEAN GRADIENT: 2 MMHG
MV PEAK A VEL: 0.81 M/S
MV PEAK E VEL: 0.96 M/S
MV PEAK GRADIENT: 4 MMHG
MV STENOSIS PRESSURE HALF TIME: 72.14 MS
MV VALVE AREA P 1/2 METHOD: 3.05 CM2
OHS CV RV/LV RATIO: 0.67 CM
OHS LV EJECTION FRACTION SIMPSONS BIPLANE MOD: 54 %
PISA MRMAX VEL: 3.59 M/S
PISA TR MAX VEL: 2.33 M/S
PV PEAK GRADIENT: 5 MMHG
PV PEAK VELOCITY: 1.1 M/S
RA PRESSURE ESTIMATED: 3 MMHG
RIGHT VENTRICULAR END-DIASTOLIC DIMENSION: 2.86 CM
RV TB RVSP: 5 MMHG
TR MAX PG: 22 MMHG
TV REST PULMONARY ARTERY PRESSURE: 25 MMHG

## 2024-08-21 PROCEDURE — 93306 TTE W/DOPPLER COMPLETE: CPT

## 2024-08-23 DIAGNOSIS — Z12.31 OTHER SCREENING MAMMOGRAM: Primary | ICD-10-CM

## 2024-12-05 ENCOUNTER — HOSPITAL ENCOUNTER (OUTPATIENT)
Dept: RADIOLOGY | Facility: HOSPITAL | Age: 64
Discharge: HOME | End: 2024-12-05
Attending: FAMILY MEDICINE
Payer: COMMERCIAL

## 2024-12-05 DIAGNOSIS — Z12.31 OTHER SCREENING MAMMOGRAM: ICD-10-CM

## 2024-12-05 PROCEDURE — 77063 BREAST TOMOSYNTHESIS BI: CPT | Mod: TC

## 2025-01-13 ENCOUNTER — LAB VISIT (OUTPATIENT)
Dept: LAB | Facility: HOSPITAL | Age: 65
End: 2025-01-13
Attending: FAMILY MEDICINE
Payer: COMMERCIAL

## 2025-01-13 DIAGNOSIS — Z00.00 ROUTINE GENERAL MEDICAL EXAMINATION AT A HEALTH CARE FACILITY: ICD-10-CM

## 2025-01-13 DIAGNOSIS — I10 HYPERTENSION, UNSPECIFIED TYPE: ICD-10-CM

## 2025-01-13 DIAGNOSIS — E78.2 MIXED HYPERLIPIDEMIA: ICD-10-CM

## 2025-01-13 DIAGNOSIS — E55.9 VITAMIN D DEFICIENCY: Primary | ICD-10-CM

## 2025-01-13 DIAGNOSIS — E03.9 HYPOTHYROIDISM, UNSPECIFIED TYPE: ICD-10-CM

## 2025-01-13 LAB
25(OH)D3+25(OH)D2 SERPL-MCNC: 47 NG/ML (ref 30–80)
ALBUMIN SERPL-MCNC: 3.7 G/DL (ref 3.4–4.8)
ALBUMIN/GLOB SERPL: 1.2 RATIO (ref 1.1–2)
ALP SERPL-CCNC: 97 UNIT/L (ref 40–150)
ALT SERPL-CCNC: 14 UNIT/L (ref 0–55)
ANION GAP SERPL CALC-SCNC: 6 MEQ/L
AST SERPL-CCNC: 14 UNIT/L (ref 5–34)
BACTERIA #/AREA URNS AUTO: ABNORMAL /HPF
BASOPHILS # BLD AUTO: 0.07 X10(3)/MCL
BASOPHILS NFR BLD AUTO: 0.9 %
BILIRUB SERPL-MCNC: 0.7 MG/DL
BILIRUB UR QL STRIP.AUTO: NEGATIVE
BUN SERPL-MCNC: 15 MG/DL (ref 9.8–20.1)
CALCIUM SERPL-MCNC: 9.6 MG/DL (ref 8.4–10.2)
CAOX CRY UR QL COMP ASSIST: ABNORMAL
CHLORIDE SERPL-SCNC: 109 MMOL/L (ref 98–107)
CHOLEST SERPL-MCNC: 146 MG/DL
CHOLEST/HDLC SERPL: 3 {RATIO} (ref 0–5)
CLARITY UR: CLEAR
CO2 SERPL-SCNC: 28 MMOL/L (ref 23–31)
COLOR UR AUTO: YELLOW
CREAT SERPL-MCNC: 0.68 MG/DL (ref 0.55–1.02)
CREAT/UREA NIT SERPL: 22
EOSINOPHIL # BLD AUTO: 0.26 X10(3)/MCL (ref 0–0.9)
EOSINOPHIL NFR BLD AUTO: 3.3 %
ERYTHROCYTE [DISTWIDTH] IN BLOOD BY AUTOMATED COUNT: 13.1 % (ref 11.5–17)
GFR SERPLBLD CREATININE-BSD FMLA CKD-EPI: >60 ML/MIN/1.73/M2
GLOBULIN SER-MCNC: 3.1 GM/DL (ref 2.4–3.5)
GLUCOSE SERPL-MCNC: 100 MG/DL (ref 82–115)
GLUCOSE UR QL STRIP: NEGATIVE
HCT VFR BLD AUTO: 46 % (ref 37–47)
HDLC SERPL-MCNC: 50 MG/DL (ref 35–60)
HGB BLD-MCNC: 14.8 G/DL (ref 12–16)
HGB UR QL STRIP: NEGATIVE
IMM GRANULOCYTES # BLD AUTO: 0.02 X10(3)/MCL (ref 0–0.04)
IMM GRANULOCYTES NFR BLD AUTO: 0.3 %
KETONES UR QL STRIP: NEGATIVE
LDLC SERPL CALC-MCNC: 78 MG/DL (ref 50–140)
LEUKOCYTE ESTERASE UR QL STRIP: ABNORMAL
LYMPHOCYTES # BLD AUTO: 1.56 X10(3)/MCL (ref 0.6–4.6)
LYMPHOCYTES NFR BLD AUTO: 19.7 %
MCH RBC QN AUTO: 27.8 PG (ref 27–31)
MCHC RBC AUTO-ENTMCNC: 32.2 G/DL (ref 33–36)
MCV RBC AUTO: 86.3 FL (ref 80–94)
MONOCYTES # BLD AUTO: 0.41 X10(3)/MCL (ref 0.1–1.3)
MONOCYTES NFR BLD AUTO: 5.2 %
NEUTROPHILS # BLD AUTO: 5.59 X10(3)/MCL (ref 2.1–9.2)
NEUTROPHILS NFR BLD AUTO: 70.6 %
NITRITE UR QL STRIP: NEGATIVE
NRBC BLD AUTO-RTO: 0 %
PH UR STRIP: 5 [PH]
PLATELET # BLD AUTO: 233 X10(3)/MCL (ref 130–400)
PMV BLD AUTO: 10.7 FL (ref 7.4–10.4)
POTASSIUM SERPL-SCNC: 3.7 MMOL/L (ref 3.5–5.1)
PROT SERPL-MCNC: 6.8 GM/DL (ref 5.8–7.6)
PROT UR QL STRIP: NEGATIVE
RBC # BLD AUTO: 5.33 X10(6)/MCL (ref 4.2–5.4)
RBC #/AREA URNS AUTO: ABNORMAL /HPF
SODIUM SERPL-SCNC: 143 MMOL/L (ref 136–145)
SP GR UR STRIP.AUTO: >=1.03 (ref 1–1.03)
SQUAMOUS #/AREA URNS AUTO: ABNORMAL /HPF
T4 FREE SERPL-MCNC: 1.49 NG/DL (ref 0.7–1.48)
TRIGL SERPL-MCNC: 92 MG/DL (ref 37–140)
TSH SERPL-ACNC: 1.78 UIU/ML (ref 0.35–4.94)
UROBILINOGEN UR STRIP-ACNC: 0.2
VLDLC SERPL CALC-MCNC: 18 MG/DL
WBC # BLD AUTO: 7.91 X10(3)/MCL (ref 4.5–11.5)
WBC #/AREA URNS AUTO: ABNORMAL /HPF

## 2025-01-13 PROCEDURE — 80053 COMPREHEN METABOLIC PANEL: CPT

## 2025-01-13 PROCEDURE — 85025 COMPLETE CBC W/AUTO DIFF WBC: CPT

## 2025-01-13 PROCEDURE — 84443 ASSAY THYROID STIM HORMONE: CPT

## 2025-01-13 PROCEDURE — 84439 ASSAY OF FREE THYROXINE: CPT

## 2025-01-13 PROCEDURE — 82306 VITAMIN D 25 HYDROXY: CPT

## 2025-01-13 PROCEDURE — 36415 COLL VENOUS BLD VENIPUNCTURE: CPT

## 2025-01-13 PROCEDURE — 80061 LIPID PANEL: CPT

## 2025-01-13 PROCEDURE — 81003 URINALYSIS AUTO W/O SCOPE: CPT

## 2025-04-11 DIAGNOSIS — I34.0 MITRAL INCOMPETENCE: ICD-10-CM

## 2025-04-11 DIAGNOSIS — R06.09 DYSPNEA ON EXERTION: Primary | ICD-10-CM

## 2025-04-11 DIAGNOSIS — I25.118 ATHSCL HEART DISEASE OF NATIVE COR ART W OTH ANG PCTRS: ICD-10-CM

## 2025-04-14 DIAGNOSIS — H25.89 CATARACT MATURE, TOTAL SENILE: Primary | ICD-10-CM

## 2025-04-22 ENCOUNTER — OFFICE VISIT (OUTPATIENT)
Dept: OPHTHALMOLOGY | Facility: CLINIC | Age: 65
End: 2025-04-22
Payer: COMMERCIAL

## 2025-04-22 VITALS — HEIGHT: 62 IN | BODY MASS INDEX: 33.1 KG/M2 | WEIGHT: 179.88 LBS

## 2025-04-22 DIAGNOSIS — H35.3111 EARLY DRY STAGE NONEXUDATIVE AGE-RELATED MACULAR DEGENERATION OF RIGHT EYE: ICD-10-CM

## 2025-04-22 DIAGNOSIS — D23.112 PAPILLOMA OF RIGHT LOWER EYELID: ICD-10-CM

## 2025-04-22 DIAGNOSIS — H25.89 CATARACT MATURE, TOTAL SENILE: Primary | ICD-10-CM

## 2025-04-22 PROCEDURE — 92136 OPHTHALMIC BIOMETRY: CPT | Mod: PBBFAC,PN

## 2025-04-22 PROCEDURE — 76512 OPH US DX B-SCAN: CPT | Mod: PBBFAC,PN

## 2025-04-22 PROCEDURE — 92136 OPHTHALMIC BIOMETRY: CPT | Mod: PBBFAC,PN | Performed by: OPHTHALMOLOGY

## 2025-04-22 PROCEDURE — 76512 OPH US DX B-SCAN: CPT | Mod: 50,PBBFAC,PN | Performed by: OPHTHALMOLOGY

## 2025-04-22 PROCEDURE — 99213 OFFICE O/P EST LOW 20 MIN: CPT | Mod: PBBFAC,PN

## 2025-04-22 PROCEDURE — 92134 CPTRZ OPH DX IMG PST SGM RTA: CPT | Mod: PBBFAC,PN | Performed by: OPHTHALMOLOGY

## 2025-04-22 PROCEDURE — 92134 CPTRZ OPH DX IMG PST SGM RTA: CPT | Mod: PBBFAC,PN

## 2025-04-22 RX ORDER — PHENYLEPH/TROPICAMIDE IN WATER 2.5 %-1 %
1 DROPS OPHTHALMIC (EYE) ONCE
Status: COMPLETED | OUTPATIENT
Start: 2025-04-22 | End: 2025-04-22

## 2025-04-22 RX ADMIN — Medication 1 DROP: at 08:04

## 2025-04-22 NOTE — PROGRESS NOTES
HPI    Patient has a cataract in her left eye, surgery done in 2013 on right eye   Last edited by Sylvie Wick MA on 4/22/2025  8:20 AM.            Assessment /Plan     For exam results, see Encounter Report.    Cataract mature, total senile  -     Ambulatory referral/consult to Ophthalmology  -     tropicamide /PHENYLephrine opthalmic solution 1 drop    Early dry stage nonexudative age-related macular degeneration of right eye    Papilloma of right lower eyelid           Slit lamp photos right lower eyelid - papillomatous lesion at the lower eyelid margin     B scan OS 4/22/25: No RD or mass    OCT mac OD 4/22/25: small drusen, no SRF or IRF - unable to perform OS    Combined form of age-related cataract, left eye   - Cataract surgery recommended and expected to improve vision. Vision is not correctable by glasses or other non-surgical measures. R/B/A were discussed with the patient. These included, but are not limited to: bleeding, infection, loss of vision, loss of the eye, need for more surgery, glaucoma, retinal detachment, need for glasses post-operatively, corneal edema. The patient voiced understanding and wishes to proceed.   - Lens options were discussed with the patient including monofocal lenses and toric lenses. The risks and benefits of each were discussed, including halos, glare, and possible need for glasses. No guarantees about vision after surgery were given. The patient voiced understanding and wishes to proceed with a refractive target set at distance. The patient understands they will need correction for near post-operatively.  - White cataract with LP vision   - B scan 4/22/25 without detachment or masses  - Trauma: Denies   - Guttae: None  - Phaco/iridodonesis: None  - Trypan blue: Yes  - Flomax use: None  - Dilation: 6 mm  - Anticoagulant/antiplatelet use: Yes, ok to continue  - Cooperative with exam: Pt able to lie flat for 30 minutes, will plan to do under local  - Comorbidities: s/p open  "heart surgery in 2013  - Medical clearance: Need cardiology clearance; sees Dr. Joel Robles at the [facility] in [place] - has testing scheduled next week  - Proceed with CEIOL OS once cleared; could use toric though with white cataract  - Date of surgery: 5/13/25 with Dr. Leon/Vladimir - pending cardiology clearance    AMD, dry, early, right eye  - Former smoker  - Discussed AREDS2, provided with Amsler grid  - Needs DFE/OCT macula left eye after CEIOL     Eyelid lesion, right lower eyelid  - Appears papillomatous/benign  - Personal history of stage I breast cancer diagnosed in September 2016, completed adjuvant radiation, Prolia, and Femara (finished Jan 2022); last gilmar 12/9/24 negative for malignancy  - No personal or family history of skin cancer  - Per patient, been there "for years" on 4/22/25  - Monitor with photos    RTC POD1                 "

## 2025-04-23 PROBLEM — H35.3111 EARLY DRY STAGE NONEXUDATIVE AGE-RELATED MACULAR DEGENERATION OF RIGHT EYE: Status: ACTIVE | Noted: 2025-04-23

## 2025-04-23 PROBLEM — D23.112 PAPILLOMA OF RIGHT LOWER EYELID: Status: ACTIVE | Noted: 2025-04-23

## 2025-04-30 ENCOUNTER — HOSPITAL ENCOUNTER (OUTPATIENT)
Dept: RADIOLOGY | Facility: HOSPITAL | Age: 65
Discharge: HOME | End: 2025-04-30
Attending: INTERNAL MEDICINE
Payer: COMMERCIAL

## 2025-04-30 ENCOUNTER — HOSPITAL ENCOUNTER (OUTPATIENT)
Dept: CARDIOLOGY | Facility: HOSPITAL | Age: 65
Discharge: HOME | End: 2025-04-30
Attending: INTERNAL MEDICINE
Payer: COMMERCIAL

## 2025-04-30 DIAGNOSIS — R06.09 DYSPNEA ON EXERTION: ICD-10-CM

## 2025-04-30 DIAGNOSIS — I25.118 ATHSCL HEART DISEASE OF NATIVE COR ART W OTH ANG PCTRS: ICD-10-CM

## 2025-04-30 DIAGNOSIS — I34.0 MITRAL INCOMPETENCE: ICD-10-CM

## 2025-04-30 LAB
APICAL FOUR CHAMBER EJECTION FRACTION: 59 %
APICAL TWO CHAMBER EJECTION FRACTION: 52 %
AV INDEX (PROSTH): 0.62
AV MEAN GRADIENT: 4 MMHG
AV PEAK GRADIENT: 8 MMHG
AV VALVE AREA BY VELOCITY RATIO: 2 CM²
AV VALVE AREA: 1.9 CM²
AV VELOCITY RATIO: 0.64
CV ECHO LV RWT: 0.41 CM
DOP CALC AO PEAK VEL: 1.4 M/S
DOP CALC AO VTI: 27.6 CM
DOP CALC LVOT AREA: 3.1 CM2
DOP CALC LVOT DIAMETER: 2 CM
DOP CALC LVOT PEAK VEL: 0.9 M/S
DOP CALC LVOT STROKE VOLUME: 53.7 CM3
DOP CALC MV VTI: 35.7 CM
DOP CALCLVOT PEAK VEL VTI: 17.1 CM
E WAVE DECELERATION TIME: 214 MSEC
E/A RATIO: 1.08
E/E' RATIO: 10 M/S
ECHO LV POSTERIOR WALL: 0.9 CM (ref 0.6–1.1)
FRACTIONAL SHORTENING: 34.1 % (ref 28–44)
HR MV ECHO: 70 BPM
INTERVENTRICULAR SEPTUM: 1 CM (ref 0.6–1.1)
LEFT ATRIUM AREA SYSTOLIC (APICAL 2 CHAMBER): 18.5 CM2
LEFT ATRIUM AREA SYSTOLIC (APICAL 4 CHAMBER): 18.3 CM2
LEFT ATRIUM SIZE: 4.3 CM
LEFT ATRIUM VOLUME MOD: 53 ML
LEFT INTERNAL DIMENSION IN SYSTOLE: 2.9 CM (ref 2.1–4)
LEFT VENTRICLE DIASTOLIC VOLUME: 88 ML
LEFT VENTRICLE END DIASTOLIC VOLUME APICAL 2 CHAMBER: 48.2 ML
LEFT VENTRICLE END DIASTOLIC VOLUME APICAL 4 CHAMBER: 71.7 ML
LEFT VENTRICLE END SYSTOLIC VOLUME APICAL 2 CHAMBER: 54 ML
LEFT VENTRICLE END SYSTOLIC VOLUME APICAL 4 CHAMBER: 48 ML
LEFT VENTRICLE SYSTOLIC VOLUME: 32 ML
LEFT VENTRICULAR INTERNAL DIMENSION IN DIASTOLE: 4.4 CM (ref 3.5–6)
LEFT VENTRICULAR MASS: 137.8 G
LV LATERAL E/E' RATIO: 9.4 M/S
LV SEPTAL E/E' RATIO: 11.3 M/S
LVED V (TEICH): 87.7 ML
LVES V (TEICH): 32.2 ML
LVOT MG: 2 MMHG
LVOT MV: 0.61 CM/S
MV MEAN GRADIENT: 3 MMHG
MV PEAK A VEL: 1.05 M/S
MV PEAK E VEL: 1.13 M/S
MV PEAK GRADIENT: 7 MMHG
MV STENOSIS PRESSURE HALF TIME: 81 MS
MV VALVE AREA BY CONTINUITY EQUATION: 1.5 CM2
MV VALVE AREA P 1/2 METHOD: 2.72 CM2
OHS CV CPX 85 PERCENT MAX PREDICTED HEART RATE MALE: 134
OHS CV CPX MAX PREDICTED HEART RATE: 158
OHS CV CPX PATIENT IS FEMALE: 1
OHS CV CPX PATIENT IS MALE: 0
OHS CV INITIAL DOSE: 11.5 MCG/KG/MIN
OHS LV EJECTION FRACTION SIMPSONS BIPLANE MOD: 55 %
RA PRESSURE ESTIMATED: 3 MMHG
SINUS: 3 CM
TDI LATERAL: 0.12 M/S
TDI SEPTAL: 0.1 M/S
TDI: 0.11 M/S
TRICUSPID ANNULAR PLANE SYSTOLIC EXCURSION: 1.5 CM

## 2025-04-30 PROCEDURE — 78452 HT MUSCLE IMAGE SPECT MULT: CPT

## 2025-04-30 PROCEDURE — 93306 TTE W/DOPPLER COMPLETE: CPT | Mod: 26,,, | Performed by: INTERNAL MEDICINE

## 2025-04-30 PROCEDURE — 93306 TTE W/DOPPLER COMPLETE: CPT

## 2025-04-30 PROCEDURE — 93017 CV STRESS TEST TRACING ONLY: CPT

## 2025-04-30 PROCEDURE — A9500 TC99M SESTAMIBI: HCPCS | Performed by: INTERNAL MEDICINE

## 2025-04-30 RX ORDER — REGADENOSON 0.08 MG/ML
0.4 INJECTION, SOLUTION INTRAVENOUS
Status: DISCONTINUED | OUTPATIENT
Start: 2025-04-30 | End: 2025-05-01 | Stop reason: HOSPADM

## 2025-04-30 RX ORDER — TETRAKIS(2-METHOXYISOBUTYLISOCYANIDE)COPPER(I) TETRAFLUOROBORATE 1 MG/ML
11.5 INJECTION, POWDER, LYOPHILIZED, FOR SOLUTION INTRAVENOUS
Status: COMPLETED | OUTPATIENT
Start: 2025-04-30 | End: 2025-04-30

## 2025-04-30 RX ADMIN — KIT FOR THE PREPARATION OF TECHNETIUM TC99M SESTAMIBI 11.5 MILLICURIE: 1 INJECTION, POWDER, LYOPHILIZED, FOR SOLUTION PARENTERAL at 08:04

## 2025-05-01 DIAGNOSIS — I25.118 CORONARY ARTERY DISEASE INVOLVING NATIVE CORONARY ARTERY OF NATIVE HEART WITH OTHER FORM OF ANGINA PECTORIS: Primary | ICD-10-CM

## 2025-05-01 DIAGNOSIS — I20.89 ANGINA DECUBITUS: ICD-10-CM

## 2025-05-02 ENCOUNTER — TELEPHONE (OUTPATIENT)
Dept: OPHTHALMOLOGY | Facility: CLINIC | Age: 65
End: 2025-05-02
Payer: COMMERCIAL

## 2025-05-02 NOTE — TELEPHONE ENCOUNTER
"Patient getting echo and stress test done 5/22/25, surgery cancelled for 5/13/25 pending clearance. Discussed with patient that we will call after to schedule her, ideally early June date per patient. Patient voiced understanding.     Roxanne Stuart (Jessica)  [facility], PGY-3  05/02/2025  2:23 PM    "

## 2025-05-22 ENCOUNTER — HOSPITAL ENCOUNTER (OUTPATIENT)
Dept: CARDIOLOGY | Facility: HOSPITAL | Age: 65
Discharge: HOME | End: 2025-05-22
Attending: INTERNAL MEDICINE
Payer: COMMERCIAL

## 2025-05-22 ENCOUNTER — HOSPITAL ENCOUNTER (OUTPATIENT)
Dept: RADIOLOGY | Facility: HOSPITAL | Age: 65
Discharge: HOME | End: 2025-05-22
Attending: INTERNAL MEDICINE
Payer: COMMERCIAL

## 2025-05-22 DIAGNOSIS — I20.89 ANGINA DECUBITUS: ICD-10-CM

## 2025-05-22 DIAGNOSIS — I25.118 CORONARY ARTERY DISEASE INVOLVING NATIVE CORONARY ARTERY OF NATIVE HEART WITH OTHER FORM OF ANGINA PECTORIS: ICD-10-CM

## 2025-05-22 LAB
OHS CV CPX 85 PERCENT MAX PREDICTED HEART RATE MALE: 134
OHS CV CPX MAX PREDICTED HEART RATE: 158
OHS CV CPX PATIENT IS FEMALE: 1
OHS CV CPX PATIENT IS MALE: 0
OHS CV INITIAL DOSE: 10.1 MCG/KG/MIN
OHS CV PEAK DOSE: 33 MCG/KG/MIN

## 2025-05-22 PROCEDURE — 78452 HT MUSCLE IMAGE SPECT MULT: CPT

## 2025-05-22 PROCEDURE — 93017 CV STRESS TEST TRACING ONLY: CPT

## 2025-05-22 PROCEDURE — A9500 TC99M SESTAMIBI: HCPCS | Performed by: INTERNAL MEDICINE

## 2025-05-22 PROCEDURE — 63600175 PHARM REV CODE 636 W HCPCS: Performed by: INTERNAL MEDICINE

## 2025-05-22 RX ORDER — TETRAKIS(2-METHOXYISOBUTYLISOCYANIDE)COPPER(I) TETRAFLUOROBORATE 1 MG/ML
33 INJECTION, POWDER, LYOPHILIZED, FOR SOLUTION INTRAVENOUS
Status: DISCONTINUED | OUTPATIENT
Start: 2025-05-22 | End: 2025-05-23 | Stop reason: HOSPADM

## 2025-05-22 RX ORDER — TETRAKIS(2-METHOXYISOBUTYLISOCYANIDE)COPPER(I) TETRAFLUOROBORATE 1 MG/ML
10.1 INJECTION, POWDER, LYOPHILIZED, FOR SOLUTION INTRAVENOUS
Status: COMPLETED | OUTPATIENT
Start: 2025-05-22 | End: 2025-05-22

## 2025-05-22 RX ORDER — REGADENOSON 0.08 MG/ML
0.4 INJECTION, SOLUTION INTRAVENOUS
Status: COMPLETED | OUTPATIENT
Start: 2025-05-22 | End: 2025-05-22

## 2025-05-22 RX ADMIN — REGADENOSON 0.4 MG: 0.08 INJECTION, SOLUTION INTRAVENOUS at 09:05

## 2025-05-22 RX ADMIN — KIT FOR THE PREPARATION OF TECHNETIUM TC99M SESTAMIBI 10.1 MILLICURIE: 1 INJECTION, POWDER, LYOPHILIZED, FOR SOLUTION PARENTERAL at 08:05

## 2025-05-27 DIAGNOSIS — H26.8 MATURE CATARACT: ICD-10-CM

## 2025-05-27 DIAGNOSIS — H25.89 CATARACT MATURE, TOTAL SENILE: Primary | ICD-10-CM

## 2025-05-27 RX ORDER — TETRACAINE HYDROCHLORIDE 5 MG/ML
1 SOLUTION OPHTHALMIC
OUTPATIENT
Start: 2025-05-27

## 2025-05-27 RX ORDER — CYCLOPENTOLAT/TROPIC/PHENYLEPH 1%-1%-2.5%
1 DROPS (EA) OPHTHALMIC (EYE)
OUTPATIENT
Start: 2025-05-27

## 2025-05-27 NOTE — PROGRESS NOTES
Assessment /Plan     For exam results, see Encounter Report.    Cataract mature, total senile  -     Place in Outpatient; Standing  -     Case Request Operating Room: PHACOEMULSIFICATION, CATARACT    Mature cataract    Other orders  -     tropicamide/cyclopentolate/PHENYLephrine (1%-1%-2.5%) OPHThalmic solution (0.5 mL)  -     TETRAcaine HCl (PF) 0.5 % Drop 1 drop      Contacted patient regarding Cardiology clearance. Pt follows w/ Dr Joel Robles at [facility]. Recent stress test was normal. Will contact Cardiology clinic to have clearance faxed over/added to chart for cataract surgery. Patient very interested in surgery and would like to schedule as soon as possible. Plan to schedule CEIOL OS for 6/5/25 with Carolyn/Rosita. Patient will follow up POD#0 s/o CEIOL OS.     Mike Leon MD  [facility], PGY-4

## 2025-05-27 NOTE — LETTER
[facility] - Ophthalmology Department  Surgery Clearance Request Form  Patients Name: Shelly Malik  : 1960  Today's Date: 2025   EYE-CLINIC-PROVIDERS: Rose Asif MD      The above named patient is scheduled to have a Cataract Extraction with lens placement Left Eye on 25.    Attending Surgeon: EYE-CLINIC-PROVIDERS: Hernandez Becker MD   [facility]   Type of Anesthesia: Local MAC   Requesting Staff Name: Peyton Turner RN    This patient needs surgical clearance from your office for this procedure.    Please perform necessary tests in order for this patient to be medically cleared for surgery.     PLEASE FAX THE CLEARANCE LETTER WITH THE MOST RECENT DIAGNOSTICS AND PROGRESS NOTES TO US AT [phone].  PLEASE BE SURE TO INCLUDE ANY MEDICATION INSTRUCTIONS THAT SHOULD BE HELD PRIOR TO SURGERY.     CHECK ALL THAT APPLY AND COMPLETE THE BLANKS:   [x] Request for cardiac risk assessment for procedure stated above.   (Please fax us the risk assessment/clearance letter with the most recent ECHO, EKG or stress test.)   [x] Medication instructions prior to surgery.   (Please specify if you recommend the patient stop any medications prior to surgery and how many days prior to surgery.)   [x] Request for Surgery clearance for the procedure stated above.   (Please fax us the clearance letter with the most recent diagnostics and progress notes.)    We appreciate your help in getting our patient cleared for surgery.    Please feel free to call our office should you have any questions.     Thank you,   EYE-CLINIC-PROVIDERS: Rose Asif MD      Phone Number:  [phone]  Fax Number:  [phone]

## 2025-06-04 ENCOUNTER — PATIENT MESSAGE (OUTPATIENT)
Dept: SURGERY | Facility: HOSPITAL | Age: 65
End: 2025-06-04
Payer: COMMERCIAL

## 2025-06-04 ENCOUNTER — TELEPHONE (OUTPATIENT)
Dept: OPHTHALMOLOGY | Facility: CLINIC | Age: 65
End: 2025-06-04
Payer: COMMERCIAL

## 2025-06-05 ENCOUNTER — OFFICE VISIT (OUTPATIENT)
Dept: OPHTHALMOLOGY | Facility: CLINIC | Age: 65
End: 2025-06-05
Payer: COMMERCIAL

## 2025-06-05 ENCOUNTER — ANESTHESIA EVENT (OUTPATIENT)
Dept: SURGERY | Facility: HOSPITAL | Age: 65
End: 2025-06-05
Payer: COMMERCIAL

## 2025-06-05 ENCOUNTER — HOSPITAL ENCOUNTER (OUTPATIENT)
Facility: HOSPITAL | Age: 65
Discharge: HOME | End: 2025-06-05
Attending: OPHTHALMOLOGY | Admitting: OPHTHALMOLOGY
Payer: COMMERCIAL

## 2025-06-05 ENCOUNTER — ANESTHESIA (OUTPATIENT)
Dept: SURGERY | Facility: HOSPITAL | Age: 65
End: 2025-06-05
Payer: COMMERCIAL

## 2025-06-05 VITALS
HEART RATE: 83 BPM | DIASTOLIC BLOOD PRESSURE: 87 MMHG | BODY MASS INDEX: 31.92 KG/M2 | SYSTOLIC BLOOD PRESSURE: 129 MMHG | TEMPERATURE: 98 F | WEIGHT: 174.63 LBS | OXYGEN SATURATION: 95 %

## 2025-06-05 VITALS — HEIGHT: 62 IN | BODY MASS INDEX: 32.14 KG/M2 | WEIGHT: 174.63 LBS

## 2025-06-05 DIAGNOSIS — H25.89 CATARACT MATURE, TOTAL SENILE: ICD-10-CM

## 2025-06-05 DIAGNOSIS — H26.8 MATURE CATARACT: ICD-10-CM

## 2025-06-05 DIAGNOSIS — Z98.890 POSTOPERATIVE EYE STATE: Primary | ICD-10-CM

## 2025-06-05 PROCEDURE — 63600175 PHARM REV CODE 636 W HCPCS: Performed by: ANESTHESIOLOGY

## 2025-06-05 PROCEDURE — 25000003 PHARM REV CODE 250

## 2025-06-05 PROCEDURE — 37000008 HC ANESTHESIA 1ST 15 MINUTES: Performed by: OPHTHALMOLOGY

## 2025-06-05 PROCEDURE — 36000707: Performed by: OPHTHALMOLOGY

## 2025-06-05 PROCEDURE — 36000706: Performed by: OPHTHALMOLOGY

## 2025-06-05 PROCEDURE — V2632 POST CHMBR INTRAOCULAR LENS: HCPCS | Performed by: OPHTHALMOLOGY

## 2025-06-05 PROCEDURE — 37000009 HC ANESTHESIA EA ADD 15 MINS: Performed by: OPHTHALMOLOGY

## 2025-06-05 PROCEDURE — 25000003 PHARM REV CODE 250: Performed by: OPHTHALMOLOGY

## 2025-06-05 PROCEDURE — 63600175 PHARM REV CODE 636 W HCPCS: Performed by: OPHTHALMOLOGY

## 2025-06-05 PROCEDURE — 71000015 HC POSTOP RECOV 1ST HR: Performed by: OPHTHALMOLOGY

## 2025-06-05 PROCEDURE — 63600175 PHARM REV CODE 636 W HCPCS: Performed by: NURSE ANESTHETIST, CERTIFIED REGISTERED

## 2025-06-05 PROCEDURE — 99214 OFFICE O/P EST MOD 30 MIN: CPT | Mod: PBBFAC,PN

## 2025-06-05 DEVICE — IMPLANTABLE DEVICE: Type: IMPLANTABLE DEVICE | Site: EYE | Status: FUNCTIONAL

## 2025-06-05 RX ORDER — CYCLOPENTOLAT/TROPIC/PHENYLEPH 1%-1%-2.5%
1 DROPS (EA) OPHTHALMIC (EYE)
Status: DISCONTINUED | OUTPATIENT
Start: 2025-06-05 | End: 2025-06-05 | Stop reason: HOSPADM

## 2025-06-05 RX ORDER — LETROZOLE 2.5 MG/1
TABLET, FILM COATED ORAL
COMMUNITY

## 2025-06-05 RX ORDER — METHYLPREDNISOLONE 4 MG/1
TABLET ORAL
COMMUNITY
Start: 2024-07-18

## 2025-06-05 RX ORDER — MUPIROCIN 20 MG/G
1 OINTMENT TOPICAL
COMMUNITY
Start: 2025-01-13

## 2025-06-05 RX ORDER — SODIUM CHLORIDE, SODIUM LACTATE, POTASSIUM CHLORIDE, CALCIUM CHLORIDE 600; 310; 30; 20 MG/100ML; MG/100ML; MG/100ML; MG/100ML
INJECTION, SOLUTION INTRAVENOUS CONTINUOUS
Status: DISCONTINUED | OUTPATIENT
Start: 2025-06-05 | End: 2025-06-05 | Stop reason: HOSPADM

## 2025-06-05 RX ORDER — MIDAZOLAM HYDROCHLORIDE 1 MG/ML
INJECTION INTRAMUSCULAR; INTRAVENOUS
Status: DISCONTINUED | OUTPATIENT
Start: 2025-06-05 | End: 2025-06-13

## 2025-06-05 RX ORDER — ALPRAZOLAM 0.5 MG/1
0.5 TABLET ORAL
COMMUNITY
Start: 2025-05-06

## 2025-06-05 RX ORDER — TETRACAINE HYDROCHLORIDE 5 MG/ML
1 SOLUTION OPHTHALMIC
Status: DISCONTINUED | OUTPATIENT
Start: 2025-06-05 | End: 2025-06-05 | Stop reason: HOSPADM

## 2025-06-05 RX ORDER — MOXIFLOXACIN 5 MG/ML
SOLUTION/ DROPS OPHTHALMIC
Status: DISCONTINUED | OUTPATIENT
Start: 2025-06-05 | End: 2025-06-05 | Stop reason: HOSPADM

## 2025-06-05 RX ORDER — EPINEPHRINE 1 MG/ML
INJECTION INTRAMUSCULAR; INTRAVENOUS; SUBCUTANEOUS
Status: DISCONTINUED | OUTPATIENT
Start: 2025-06-05 | End: 2025-06-05 | Stop reason: HOSPADM

## 2025-06-05 RX ORDER — MIDAZOLAM HYDROCHLORIDE 2 MG/2ML
2 INJECTION, SOLUTION INTRAMUSCULAR; INTRAVENOUS ONCE AS NEEDED
Status: COMPLETED | OUTPATIENT
Start: 2025-06-05 | End: 2025-06-05

## 2025-06-05 RX ORDER — AZITHROMYCIN 250 MG/1
TABLET, FILM COATED ORAL
COMMUNITY
Start: 2024-07-18

## 2025-06-05 RX ORDER — POLYETHYLENE GLYCOL 3350, SODIUM SULFATE ANHYDROUS, SODIUM BICARBONATE, SODIUM CHLORIDE, POTASSIUM CHLORIDE 236; 22.74; 6.74; 5.86; 2.97 G/4L; G/4L; G/4L; G/4L; G/4L
POWDER, FOR SOLUTION ORAL
COMMUNITY
Start: 2024-07-10

## 2025-06-05 RX ORDER — LIDOCAINE HYDROCHLORIDE 10 MG/ML
INJECTION, SOLUTION EPIDURAL; INFILTRATION; INTRACAUDAL; PERINEURAL
Status: DISCONTINUED | OUTPATIENT
Start: 2025-06-05 | End: 2025-06-05 | Stop reason: HOSPADM

## 2025-06-05 RX ADMIN — Medication 1 DROP: at 07:06

## 2025-06-05 RX ADMIN — SODIUM CHLORIDE, POTASSIUM CHLORIDE, SODIUM LACTATE AND CALCIUM CHLORIDE: 600; 310; 30; 20 INJECTION, SOLUTION INTRAVENOUS at 09:06

## 2025-06-05 RX ADMIN — MIDAZOLAM HYDROCHLORIDE 2 MG: 1 INJECTION, SOLUTION INTRAMUSCULAR; INTRAVENOUS at 10:06

## 2025-06-05 RX ADMIN — MIDAZOLAM HYDROCHLORIDE 2 MG: 1 INJECTION, SOLUTION INTRAMUSCULAR; INTRAVENOUS at 09:06

## 2025-06-05 RX ADMIN — TETRACAINE HYDROCHLORIDE 1 DROP: 5 SOLUTION OPHTHALMIC at 07:06

## 2025-06-05 NOTE — ANESTHESIA PREPROCEDURE EVALUATION
06/05/2025  Shelly Malik is a 64 y.o., female with PMHx of obesity, CAD/CABG/stents 2013, HTN, HLD, PAD, COPD, GERD, hypothyroidism, h/o breast CA presents for Lt cataract extraction.      Vitals:    06/05/25 0640 06/05/25 0653   BP:  (!) 143/77   Pulse:  79   Temp:  36.3 °C (97.4 °F)   TempSrc:  Oral   SpO2:  95%   Weight: 79.2 kg (174 lb 9.6 oz)        Metoprolol--last dose  0530  Plavix--last dose 6/4/25    Active Ambulatory Problems     Diagnosis Date Noted    Early dry stage nonexudative age-related macular degeneration of right eye 04/23/2025    Papilloma of right lower eyelid 04/23/2025     Resolved Ambulatory Problems     Diagnosis Date Noted    No Resolved Ambulatory Problems     Past Medical History:   Diagnosis Date    Abnormal liver enzymes     Abnormal mammogram     Intraductal papilloma     Invasive ductal carcinoma of left breast     Long term (current) use of aromatase inhibitors     Osteoporosis     PAD (peripheral artery disease)     Personal history of colonic polyps     Primary cancer of left female breast     Vitamin deficiency      Pharmacologic Myocardial Perfusion Imaging:    Utilizing a 1 day rest-stress protocol, 11 mCi and 33  mCi of technetium sestamibi was injected at rest and  pharmacologic stress respectively. Pharmacologic stress was achieved after intravenous injection of 0.4 mg of Regadenoson. Tomographic images of the Left ventricular myocardium were obtained in short, vertical long axis views. No evidence of patient motion artifact is noted in the cine display mode.    Rest and stress tomographic images demonstrate normal anteroseptal, lateral  and inferior wall perfusion.  No evidence of LV cavity dilatation is noted in the stress images.    Gated SPECT images of the left ventricle demonstrate normal anteroseptal, lateral and inferior wall motion with normal systolic wall  thickening. Global left ventricular ejection fraction is normal at 63%     Impression:     1. Normal Anteroseptal, lateral and inferior wall perfusion, wall motion and systolic wall thickening. No scan evidence of ischemia.    2. Normal Global left ventricular ejection fraction.  LVEF-63%.    Kannan Juarez MD     Pre-op Assessment    I have reviewed the Patient Summary Reports.     I have reviewed the Nursing Notes. I have reviewed the NPO Status.   I have reviewed the Medications.     Review of Systems  Anesthesia Hx:  No problems with previous Anesthesia   History of prior surgery of interest to airway management or planning:          Denies Family Hx of Anesthesia complications.    Denies Personal Hx of Anesthesia complications.                    Hematology/Oncology:  Hematology Normal   Oncology Normal                                   EENT/Dental:  EENT/Dental Normal           Cardiovascular:  Cardiovascular Normal                                              Pulmonary:  Pulmonary Normal                       Renal/:  Renal/ Normal                 Hepatic/GI:  Hepatic/GI Normal                    Musculoskeletal:  Musculoskeletal Normal                Neurological:  Neurology Normal                                      Endocrine:  Endocrine Normal            Dermatological:  Skin Normal    Psych:  Psychiatric Normal                    Physical Exam  General: Alert    Airway:  Mallampati: I / I  Mouth Opening: Normal  TM Distance: Normal  Tongue: Normal  Neck ROM: Normal ROM    Dental:  Intact      Lab Results   Component Value Date    WBC 7.91 01/13/2025    HGB 14.8 01/13/2025    HCT 46.0 01/13/2025    MCV 86.3 01/13/2025     01/13/2025       CMP  Sodium   Date Value Ref Range Status   01/13/2025 143 136 - 145 mmol/L Final     Potassium   Date Value Ref Range Status   01/13/2025 3.7 3.5 - 5.1 mmol/L Final     Chloride   Date Value Ref Range Status   01/13/2025 109 (H) 98 - 107 mmol/L Final     CO2    Date Value Ref Range Status   01/13/2025 28 23 - 31 mmol/L Final     Glucose   Date Value Ref Range Status   01/13/2025 100 82 - 115 mg/dL Final     Blood Urea Nitrogen   Date Value Ref Range Status   01/13/2025 15.0 9.8 - 20.1 mg/dL Final     Creatinine   Date Value Ref Range Status   01/13/2025 0.68 0.55 - 1.02 mg/dL Final     Calcium   Date Value Ref Range Status   01/13/2025 9.6 8.4 - 10.2 mg/dL Final     Protein Total   Date Value Ref Range Status   01/13/2025 6.8 5.8 - 7.6 gm/dL Final     Albumin   Date Value Ref Range Status   01/13/2025 3.7 3.4 - 4.8 g/dL Final     Bilirubin Total   Date Value Ref Range Status   01/13/2025 0.7 <=1.5 mg/dL Final     ALP   Date Value Ref Range Status   01/13/2025 97 40 - 150 unit/L Final     AST   Date Value Ref Range Status   01/13/2025 14 5 - 34 unit/L Final     ALT   Date Value Ref Range Status   01/13/2025 14 0 - 55 unit/L Final     eGFR   Date Value Ref Range Status   01/13/2025 >60 mL/min/1.73/m2 Final       CARDS OV 4/10/25        Anesthesia Plan  Type of Anesthesia, risks & benefits discussed:    Anesthesia Type: MAC  Intra-op Monitoring Plan: Standard ASA Monitors  Post Op Pain Control Plan: IV/PO Opioids PRN  (medical reason for not using multimodal pain management)  Induction:  IV  Informed Consent: Informed consent signed with the Patient and all parties understand the risks and agree with anesthesia plan.  All questions answered. Patient consented to blood products? No  ASA Score: 3  Day of Surgery Review of History & Physical: H&P Update referred to the surgeon/provider.    Ready For Surgery From Anesthesia Perspective.     .

## 2025-06-05 NOTE — TRANSFER OF CARE
Anesthesia Transfer of Care Note    Patient: Shelly Malik    Procedure(s) Performed: Procedure(s) (LRB):  PHACOEMULSIFICATION, CATARACT (Left)    Patient location: OPS    Anesthesia Type: MAC    Transport from OR: Transported from OR on room air with adequate spontaneous ventilation    Post pain: adequate analgesia    Post assessment: no apparent anesthetic complications    Post vital signs: stable    Level of consciousness: awake    Nausea/Vomiting: no nausea/vomiting    Complications: none    Transfer of care protocol was followed      Last vitals: Visit Vitals  BP (!) 143/77   Pulse 79   Temp 36.3 °C (97.4 °F) (Oral)   Wt 79.2 kg (174 lb 9.6 oz)   SpO2 95%   Breastfeeding No   BMI 31.92 kg/m²

## 2025-06-05 NOTE — ANESTHESIA POSTPROCEDURE EVALUATION
Anesthesia Post Evaluation    Patient: Shlely Malik    Procedure(s) Performed: Procedure(s) (LRB):  PHACOEMULSIFICATION, CATARACT (Left)    Final Anesthesia Type: MAC      Patient location during evaluation: OPS  Patient participation: Yes- Able to Participate  Level of consciousness: awake and alert  Post-procedure vital signs: reviewed and stable  Pain management: adequate  Airway patency: patent    PONV status at discharge: No PONV  Anesthetic complications: no      Cardiovascular status: blood pressure returned to baseline  Respiratory status: unassisted  Hydration status: euvolemic  Follow-up not needed.              Vitals Value Taken Time   /77 06/05/25 06:53   Temp 36.3 °C (97.4 °F) 06/05/25 06:53   Pulse 79 06/05/25 06:53   Resp *** 06/05/25 10:03   SpO2 95 % 06/05/25 06:53         No case tracking events are documented in the log.      Pain/Shannan Score: No data recorded

## 2025-06-11 ENCOUNTER — OFFICE VISIT (OUTPATIENT)
Dept: OPHTHALMOLOGY | Facility: CLINIC | Age: 65
End: 2025-06-11
Payer: COMMERCIAL

## 2025-06-11 VITALS — HEIGHT: 62 IN | WEIGHT: 174.63 LBS | BODY MASS INDEX: 32.14 KG/M2

## 2025-06-11 DIAGNOSIS — Z98.890 POSTOPERATIVE EYE STATE: Primary | ICD-10-CM

## 2025-06-11 PROCEDURE — 99213 OFFICE O/P EST LOW 20 MIN: CPT | Mod: PBBFAC,PN

## 2025-06-11 RX ORDER — PREDNISOLONE ACETATE 10 MG/ML
SUSPENSION/ DROPS OPHTHALMIC
Qty: 10 ML | Refills: 0 | Status: SHIPPED | OUTPATIENT
Start: 2025-06-11 | End: 2025-07-02

## 2025-06-11 NOTE — PROGRESS NOTES
"HPI     Post-op Evaluation     Additional comments: PHACOEMULSIFICATION, CATARACT, WITH IOL INSERTION   (left) 6/05/2025                Comments    RTC 1 week for POW1 s/p CEIOL - sooner PRN     Patient stated that she will need a refill on her drops           Last edited by Sylvie Wick MA on 6/11/2025  2:08 PM.            Assessment /Plan     For exam results, see Encounter Report.    Postoperative eye state  -     prednisoLONE acetate (PRED FORTE) 1 % DrpS; Place 1 drop into the left eye 3 (three) times daily for 7 days, THEN 1 drop 2 (two) times daily for 7 days, THEN 1 drop once daily for 7 days.  Dispense: 10 mL; Refill: 0      1. S/p CEIOL, OS  - DOS: 6/5/25; White Cataract (LP pre-op)   - POW#1: Doing well, diplopia resolved, VA good, IOP wnl, wounds manuel neg, pt happy with vision  - Start Pred Forte 3-2-1 weekly taper  - Continue Flurbiprofen QID until empty  - OK to stop Vigamox and eye shield  - Gradual return to normal activities  - Endophthalmitis/RD/Return precautions discussed    Not Directly Addressed Today:  ===========================================================  B scan OS 4/22/25: No RD or mass    OCT mac OD 4/22/25: small drusen, no SRF or IRF - unable to perform OS    AMD, dry, early, right eye  - Former smoker  - Discussed AREDS2, provided with Amsler grid  - Needs DFE/OCT macula left eye after CEIOL     Eyelid lesion, right lower eyelid  - Appears papillomatous/benign  - Personal history of stage I breast cancer diagnosed in September 2016, completed adjuvant radiation, Prolia, and Femara (finished Jan 2022); last gilmar 12/9/24 negative for malignancy  - No personal or family history of skin cancer  - Per patient, been there "for years" on 4/22/25  - Monitor with photos  ===========================================================    RTC 3-4 weeks for POM1 MRx/DFE - sooner PRN    "

## 2025-06-13 NOTE — ANESTHESIA POSTPROCEDURE EVALUATION
Anesthesia Post Evaluation    Patient: Shelly Malik    Procedure(s) Performed: Procedure(s) (LRB):  PHACOEMULSIFICATION, CATARACT, WITH IOL INSERTION (Left)    Final Anesthesia Type: general      Patient location during evaluation: PACU  Patient participation: Yes- Able to Participate  Level of consciousness: awake and responds to stimulation  Post-procedure vital signs: reviewed and stable  Pain management: adequate  Airway patency: patent    PONV status at discharge: No PONV  Anesthetic complications: no      Cardiovascular status: blood pressure returned to baseline  Respiratory status: unassisted  Hydration status: euvolemic  Follow-up not needed.            Vitals Value Taken Time   /87 06/05/25 11:45   Temp 36.7 °C (98.1 °F) 06/05/25 11:45   Pulse 83 06/05/25 11:45   Resp 16 06/13/25 11:24   SpO2 95 % 06/05/25 11:45         No case tracking events are documented in the log.      Pain/Shannan Score: No data recorded

## 2025-07-09 ENCOUNTER — OFFICE VISIT (OUTPATIENT)
Dept: OPHTHALMOLOGY | Facility: CLINIC | Age: 65
End: 2025-07-09
Payer: COMMERCIAL

## 2025-07-09 VITALS — HEIGHT: 62 IN | WEIGHT: 174.63 LBS | BODY MASS INDEX: 32.14 KG/M2

## 2025-07-09 DIAGNOSIS — Z98.890 POSTOPERATIVE EYE STATE: Primary | ICD-10-CM

## 2025-07-09 DIAGNOSIS — D23.112 PAPILLOMA OF RIGHT LOWER EYELID: ICD-10-CM

## 2025-07-09 DIAGNOSIS — H35.3111 EARLY DRY STAGE NONEXUDATIVE AGE-RELATED MACULAR DEGENERATION OF RIGHT EYE: ICD-10-CM

## 2025-07-09 PROCEDURE — 99213 OFFICE O/P EST LOW 20 MIN: CPT | Mod: PBBFAC,PN

## 2025-07-09 PROCEDURE — 92285 EXTERNAL OCULAR PHOTOGRAPHY: CPT | Mod: PBBFAC,PN | Performed by: OPHTHALMOLOGY

## 2025-07-09 NOTE — PROGRESS NOTES
"HPI    RTC 3-4 weeks for POM1 MRx/DFE - sooner PRN  Last edited by Dionne Arvizu MA on 7/9/2025  2:21 PM.          Assessment /Plan     For exam results, see Encounter Report.    Postoperative eye state    Papilloma of right lower eyelid    Early dry stage nonexudative age-related macular degeneration of right eye        Right lower eyelid lesion above taken 07/09/2025    B scan OS 4/22/25: No RD or mass    OCT mac OD   4/22/25: small drusen, no SRF or IRF - unable to perform OS  07/09/2025:   OD: PFC, no IRF/SRF. drusen   OS: PFC, no IRF/SRF. drusen   1. S/p CEIOL, OS  - DOS: 6/5/25; White Cataract (LP pre-op)   - Doing well, diplopia resolved, VA good, IOP wnl, wounds manuel neg, pt happy with vision  - Start Pred Forte 3-2-1 weekly taper  - Continue Flurbiprofen QID until empty  - OK to stop Vigamox and eye shield  - Gradual return to normal activities  - Endophthalmitis/RD/Return precautions discussed    2. AMD, dry, early, OU  - Former smoker, reports no longer smoking  - Discussed AREDS2, provided with Amsler grid   - OCT Mac with drusen   - return in one year for DFE, OCT mac    3. Eyelid lesion, right lower eyelid  - Appears papillomatous/benign  - Personal history of stage I breast cancer diagnosed in September 2016, completed adjuvant radiation, Prolia, and Femara (finished Jan 2022); last gilmar 12/9/24 negative for malignancy  - No personal or family history of skin cancer  - Per patient, been there "for years" on 4/22/25  - Monitor with photos (taken 07/09/2025)    RTC 1 year DFE, OCT Mac, Optos                   "

## 2025-07-14 ENCOUNTER — LAB VISIT (OUTPATIENT)
Dept: LAB | Facility: HOSPITAL | Age: 65
End: 2025-07-14
Attending: FAMILY MEDICINE
Payer: COMMERCIAL

## 2025-07-14 DIAGNOSIS — E78.2 MIXED HYPERLIPIDEMIA: ICD-10-CM

## 2025-07-14 DIAGNOSIS — I10 HYPERTENSION, UNSPECIFIED TYPE: ICD-10-CM

## 2025-07-14 DIAGNOSIS — E03.9 HYPOTHYROIDISM, UNSPECIFIED TYPE: Primary | ICD-10-CM

## 2025-07-14 LAB
25(OH)D3+25(OH)D2 SERPL-MCNC: 46 NG/ML (ref 30–80)
ALBUMIN SERPL-MCNC: 3.9 G/DL (ref 3.4–4.8)
ALBUMIN/GLOB SERPL: 1.2 RATIO (ref 1.1–2)
ALP SERPL-CCNC: 92 UNIT/L (ref 40–150)
ALT SERPL-CCNC: 11 UNIT/L (ref 0–55)
ANION GAP SERPL CALC-SCNC: 6 MEQ/L
AST SERPL-CCNC: 18 UNIT/L (ref 11–45)
BACTERIA #/AREA URNS AUTO: NORMAL /HPF
BASOPHILS # BLD AUTO: 0.06 X10(3)/MCL
BASOPHILS NFR BLD AUTO: 0.8 %
BILIRUB SERPL-MCNC: 1 MG/DL
BILIRUB UR QL STRIP.AUTO: NEGATIVE
BUN SERPL-MCNC: 16 MG/DL (ref 9.8–20.1)
CALCIUM SERPL-MCNC: 9.6 MG/DL (ref 8.4–10.2)
CHLORIDE SERPL-SCNC: 109 MMOL/L (ref 98–107)
CHOLEST SERPL-MCNC: 161 MG/DL
CHOLEST/HDLC SERPL: 3 {RATIO} (ref 0–5)
CLARITY UR: CLEAR
CO2 SERPL-SCNC: 26 MMOL/L (ref 23–31)
COLOR UR AUTO: YELLOW
CREAT SERPL-MCNC: 0.75 MG/DL (ref 0.55–1.02)
CREAT/UREA NIT SERPL: 21
EOSINOPHIL # BLD AUTO: 0.56 X10(3)/MCL (ref 0–0.9)
EOSINOPHIL NFR BLD AUTO: 7.8 %
ERYTHROCYTE [DISTWIDTH] IN BLOOD BY AUTOMATED COUNT: 13.2 % (ref 11.5–17)
GFR SERPLBLD CREATININE-BSD FMLA CKD-EPI: >60 ML/MIN/1.73/M2
GLOBULIN SER-MCNC: 3.2 GM/DL (ref 2.4–3.5)
GLUCOSE SERPL-MCNC: 104 MG/DL (ref 82–115)
GLUCOSE UR QL STRIP: NEGATIVE
HCT VFR BLD AUTO: 44.6 % (ref 37–47)
HDLC SERPL-MCNC: 61 MG/DL (ref 35–60)
HGB BLD-MCNC: 14.5 G/DL (ref 12–16)
HGB UR QL STRIP: NEGATIVE
IMM GRANULOCYTES # BLD AUTO: 0.03 X10(3)/MCL (ref 0–0.04)
IMM GRANULOCYTES NFR BLD AUTO: 0.4 %
KETONES UR QL STRIP: NEGATIVE
LDLC SERPL CALC-MCNC: 82 MG/DL (ref 50–140)
LEUKOCYTE ESTERASE UR QL STRIP: ABNORMAL
LYMPHOCYTES # BLD AUTO: 1.69 X10(3)/MCL (ref 0.6–4.6)
LYMPHOCYTES NFR BLD AUTO: 23.6 %
MCH RBC QN AUTO: 29.1 PG (ref 27–31)
MCHC RBC AUTO-ENTMCNC: 32.5 G/DL (ref 33–36)
MCV RBC AUTO: 89.6 FL (ref 80–94)
MONOCYTES # BLD AUTO: 0.49 X10(3)/MCL (ref 0.1–1.3)
MONOCYTES NFR BLD AUTO: 6.8 %
NEUTROPHILS # BLD AUTO: 4.34 X10(3)/MCL (ref 2.1–9.2)
NEUTROPHILS NFR BLD AUTO: 60.6 %
NITRITE UR QL STRIP: NEGATIVE
NRBC BLD AUTO-RTO: 0 %
PH UR STRIP: 5.5 [PH]
PLATELET # BLD AUTO: 217 X10(3)/MCL (ref 130–400)
PMV BLD AUTO: 10.3 FL (ref 7.4–10.4)
POTASSIUM SERPL-SCNC: 3.7 MMOL/L (ref 3.5–5.1)
PROT SERPL-MCNC: 7.1 GM/DL (ref 5.8–7.6)
PROT UR QL STRIP: NEGATIVE
RBC # BLD AUTO: 4.98 X10(6)/MCL (ref 4.2–5.4)
RBC #/AREA URNS AUTO: NORMAL /HPF
SODIUM SERPL-SCNC: 141 MMOL/L (ref 136–145)
SP GR UR STRIP.AUTO: 1.02 (ref 1–1.03)
SQUAMOUS #/AREA URNS AUTO: NORMAL /HPF
T4 FREE SERPL-MCNC: 1.36 NG/DL (ref 0.7–1.48)
TRIGL SERPL-MCNC: 89 MG/DL (ref 37–140)
TSH SERPL-ACNC: 2.74 UIU/ML (ref 0.35–4.94)
UROBILINOGEN UR STRIP-ACNC: 0.2
VLDLC SERPL CALC-MCNC: 18 MG/DL
WBC # BLD AUTO: 7.17 X10(3)/MCL (ref 4.5–11.5)
WBC #/AREA URNS AUTO: NORMAL /HPF

## 2025-07-14 PROCEDURE — 36415 COLL VENOUS BLD VENIPUNCTURE: CPT

## 2025-07-14 PROCEDURE — 80053 COMPREHEN METABOLIC PANEL: CPT

## 2025-07-14 PROCEDURE — 82306 VITAMIN D 25 HYDROXY: CPT

## 2025-07-14 PROCEDURE — 81003 URINALYSIS AUTO W/O SCOPE: CPT

## 2025-07-14 PROCEDURE — 84443 ASSAY THYROID STIM HORMONE: CPT

## 2025-07-14 PROCEDURE — 80061 LIPID PANEL: CPT

## 2025-07-14 PROCEDURE — 85025 COMPLETE CBC W/AUTO DIFF WBC: CPT

## 2025-07-14 PROCEDURE — 84439 ASSAY OF FREE THYROXINE: CPT

## 2025-08-19 DIAGNOSIS — M81.0 SENILE OSTEOPOROSIS: ICD-10-CM

## 2025-08-19 DIAGNOSIS — Z12.31 OTHER SCREENING MAMMOGRAM: Primary | ICD-10-CM

## (undated) DEVICE — NDL MAGELLAN SAFETY 18G 1.5IN

## (undated) DEVICE — KIT SURGICAL TURNOVER

## (undated) DEVICE — SYR 3CC LUER LOC

## (undated) DEVICE — TAPE CURAD PAPER ADH 1INX10YD

## (undated) DEVICE — Device

## (undated) DEVICE — NDL FLTR 5MCRN BLNT TIP 18GX1

## (undated) DEVICE — CONTAINER SPECIMEN OR STER 4OZ

## (undated) DEVICE — PACK CATARACT BAUSCH AND LOMB

## (undated) DEVICE — PROTECTOR ONE-STEP ARM REG

## (undated) DEVICE — DRESSING TRANS 2X2 TEGADERM